# Patient Record
Sex: FEMALE | Race: WHITE | NOT HISPANIC OR LATINO | Employment: OTHER | URBAN - METROPOLITAN AREA
[De-identification: names, ages, dates, MRNs, and addresses within clinical notes are randomized per-mention and may not be internally consistent; named-entity substitution may affect disease eponyms.]

---

## 2017-09-01 ENCOUNTER — TRANSFERRED RECORDS (OUTPATIENT)
Dept: HEALTH INFORMATION MANAGEMENT | Facility: CLINIC | Age: 52
End: 2017-09-01

## 2017-09-01 LAB
ALT SERPL-CCNC: 14 U/L (ref 6–29)
CHOLEST SERPL-MCNC: 173 MG/DL
CREAT SERPL-MCNC: 0.95 MG/DL (ref 0.5–1.05)
GFR SERPL CREATININE-BSD FRML MDRD: 69 ML/MIN/1.73M2
GLUCOSE SERPL-MCNC: 108 MG/DL (ref 65–99)
HBA1C MFR BLD: 5.7 % (ref 4–6)
HDLC SERPL-MCNC: 75 MG/DL
LDLC SERPL CALC-MCNC: 82 MG/DL
NONHDLC SERPL-MCNC: 98 MG/DL
POTASSIUM SERPL-SCNC: 4.9 MMOL/L (ref 3.5–5.3)
TRIGL SERPL-MCNC: 80 MG/DL
TSH SERPL-ACNC: 2.59 UIU/ML (ref 0.3–5)

## 2017-09-19 ENCOUNTER — OFFICE VISIT (OUTPATIENT)
Dept: FAMILY MEDICINE | Facility: CLINIC | Age: 52
End: 2017-09-19
Payer: COMMERCIAL

## 2017-09-19 VITALS
HEART RATE: 96 BPM | SYSTOLIC BLOOD PRESSURE: 118 MMHG | WEIGHT: 124.2 LBS | TEMPERATURE: 99 F | BODY MASS INDEX: 21.32 KG/M2 | DIASTOLIC BLOOD PRESSURE: 78 MMHG | OXYGEN SATURATION: 98 %

## 2017-09-19 DIAGNOSIS — K21.9 GASTROESOPHAGEAL REFLUX DISEASE WITHOUT ESOPHAGITIS: Primary | ICD-10-CM

## 2017-09-19 PROCEDURE — 99214 OFFICE O/P EST MOD 30 MIN: CPT | Performed by: FAMILY MEDICINE

## 2017-09-19 RX ORDER — PANTOPRAZOLE SODIUM 20 MG/1
TABLET, DELAYED RELEASE ORAL
Qty: 30 TABLET | Refills: 0 | Status: SHIPPED | OUTPATIENT
Start: 2017-09-19 | End: 2017-10-18

## 2017-09-19 NOTE — PROGRESS NOTES
SUBJECTIVE:   Faith Oakes is a 52 year old female who presents to clinic today for the following health issues:      GERD/Heartburn  Onset: ongoing for over year    Description:     Burning in chest: YES    Intensity: moderate    Progression of Symptoms: worsening    Accompanying Signs & Symptoms:  Does it feel like food gets stuck: no   Nausea: no   Vomiting (bloody?): no   Abdominal Pain: no   Black-Tarry stools: no :  Bloody stools: no     History:   Previous ulcers: no     Precipitating factors:   Caffeine use: YES - 2 cups a day  Alcohol use: no   NSAID/Aspirin use: no   Tobacco use: no   Worse with spicy foods.    Alleviating factors:  None    Therapies Tried and outcome:none      She felt her symptoms were resolving, so she elected to discontinue her omeprazole. She has recently redeveloped symptoms of burning in the back of the throat. This is following a spicy meal. She is not having any chest pain or epigastric pain currently.    She had improved control on omeprazole last year but reports that she is no longer having that same control taking 40 mg daily.    Had a negative EGD last year around the same time.      Problem list and histories reviewed & adjusted, as indicated.  Additional history: as documented    Patient Active Problem List   Diagnosis     Urinary, incontinence, stress female     Gastroesophageal reflux disease without esophagitis     PND (post-nasal drip)     H/O non-Hodgkin's lymphoma     H/O malignant neoplasm of breast     Past Surgical History:   Procedure Laterality Date     CYSTOSCOPY  9/21/2012    Procedure: CYSTOSCOPY;;  Surgeon: Juma Pineda MD;  Location:  OR     ESOPHAGOSCOPY, GASTROSCOPY, DUODENOSCOPY (EGD), COMBINED N/A 9/22/2016    Procedure: COMBINED ESOPHAGOSCOPY, GASTROSCOPY, DUODENOSCOPY (EGD);  Surgeon: Robert Betts MD;  Location: [facility]     HYSTERECTOMY VAGINAL  9/21/2012    Procedure: HYSTERECTOMY VAGINAL;  VAGINAL HYSTERECTOMY,  TRANSVAGINAL TAPING (Empiribox) cystoscopy;  Surgeon: Juma Pineda MD;  Location: [facility] OR     LUMPECTOMY BREAST WITH SENTINEL NODE, COMBINED  2/3/2012    Procedure:COMBINED LUMPECTOMY BREAST WITH SENTINEL NODE; WIRE LOCALIZED LEFT BREAST LUMPECTOMY WITH LEFT SENTINEL NODE BIOPSY; Surgeon:DANIEL QUIÑONES; Location:[facility]     SLING TRANSVAGINAL  9/21/2012    Procedure: SLING TRANSVAGINAL;;  Surgeon: Juma Pineda MD;  Location: [facility] OR     wisdom teeth[         Social History   Substance Use Topics     Smoking status: Never Smoker     Smokeless tobacco: Never Used     Alcohol use No     Family History   Problem Relation Age of Onset     Hypertension Mother      Coronary Artery Disease Father              Reviewed and updated as needed this visit by clinical staffTobacco  Allergies  Med Hx  Surg Hx  Fam Hx  Soc Hx      Reviewed and updated as needed this visit by Provider         ROS:  Constitutional, HEENT, cardiovascular, pulmonary, gi and gu systems are negative, except as otherwise noted.      OBJECTIVE:   /78 (BP Location: Right arm, Patient Position: Sitting, Cuff Size: Adult Regular)  Pulse 96  Temp 99  F (37.2  C) (Oral)  Wt 124 lb 3.2 oz (56.3 kg)  LMP 08/27/2012  SpO2 98%  Breastfeeding? No  BMI 21.32 kg/m2  Body mass index is 21.32 kg/(m^2).  GENERAL: healthy, alert and no distress  HENT: ear canals and TM's normal, nose and mouth without ulcers or lesions  NECK: no adenopathy, no asymmetry, masses, or scars and thyroid normal to palpation  RESP: lungs clear to auscultation - no rales, rhonchi or wheezes  CV: regular rate and rhythm, normal S1 S2, no S3 or S4, no murmur, click or rub, no peripheral edema and peripheral pulses strong    Diagnostic Test Results:  none     ASSESSMENT/PLAN:     1. Gastroesophageal reflux disease without esophagitis - worsening symptoms, this is still the most likely diagnosis given her presentation of symptoms  ongoing over the past year. Discussed treatment options, considered trial of an H2 blocker versus a stronger PPI. Discussed potential risks and benefits of each choice. She elects to try a stronger PPI. Will trial Protonix 20 mg. Discussed we have the option to increase dosage if necessary. If she has continued symptoms, could consider a gastroenterology consultation to see what a next step may be. Consideration needs to be given to possibility of chest radiation related to her non-Hodgkin's lymphoma versus breast cancer. I failed to ask this question today.  - pantoprazole (PROTONIX) 20 MG EC tablet; Take by mouth 30-60 minutes before a meal.  Dispense: 30 tablet; Refill: 0      Jazzy Camargo MD  [facility]

## 2017-09-19 NOTE — MR AVS SNAPSHOT
After Visit Summary   9/19/2017    Faith Oakes    MRN: [id number]           Patient Information     Date Of Birth          1965        Visit Information        Provider Department      9/19/2017 3:45 PM Jazzy Camargo MD [facility]        Today's Diagnoses     Gastroesophageal reflux disease without esophagitis    -  1       Follow-ups after your visit        Who to contact     If you have questions or need follow up information about today's clinic visit or your schedule please contact [facility] directly at [phone].  Normal or non-critical lab and imaging results will be communicated to you by WiOfferhart, letter or phone within 4 business days after the clinic has received the results. If you do not hear from us within 7 days, please contact the clinic through [employer] Gur[employer] or phone. If you have a critical or abnormal lab result, we will notify you by phone as soon as possible.  Submit refill requests through SureWaves or call your pharmacy and they will forward the refill request to us. Please allow 3 business days for your refill to be completed.          Additional Information About Your Visit        MyChart Information     [employer] gives you secure access to your electronic health record. If you see a primary care provider, you can also send messages to your care team and make appointments. If you have questions, please call your primary care clinic.  If you do not have a primary care provider, please call [phone] and they will assist you.        Care EveryWhere ID     This is your Care EveryWhere ID. This could be used by other organizations to access your [facility] medical records  [id number]        Your Vitals Were     Pulse Temperature Last Period Pulse Oximetry Breastfeeding? BMI (Body Mass Index)    96 99  F (37.2  C) (Oral) 08/27/2012 98% No 21.32 kg/m2       Blood Pressure from Last 3 Encounters:   09/19/17 118/78   09/22/16 102/64    09/09/16 112/73    Weight from Last 3 Encounters:   09/19/17 124 lb 3.2 oz (56.3 kg)   09/09/16 123 lb (55.8 kg)   03/23/16 122 lb (55.3 kg)              Today, you had the following     No orders found for display         Today's Medication Changes          These changes are accurate as of: 9/19/17  4:28 PM.  If you have any questions, ask your nurse or doctor.               Start taking these medicines.        Dose/Directions    pantoprazole 20 MG EC tablet   Commonly known as:  PROTONIX   Used for:  Gastroesophageal reflux disease without esophagitis   Started by:  Jazzy Camargo MD        Take by mouth 30-60 minutes before a meal.   Quantity:  30 tablet   Refills:  0         Stop taking these medicines if you haven't already. Please contact your care team if you have questions.     omeprazole 40 MG capsule   Commonly known as:  priLOSEC   Stopped by:  Jazzy Camargo MD           tamoxifen 20 MG tablet   Commonly known as:  NOLVADEX   Stopped by:  Jazzy Camargo MD                Where to get your medicines      These medications were sent to [facility]/pharmacy #[id number] - [place], [place] - [address]  [address]  [address] , [facility] [address]     Phone:  [phone]     pantoprazole 20 MG EC tablet                Primary Care Provider Office Phone # Fax #    Jazzy Camargo [phone] [address] [facility] [address]        Equal Access to Services     [facility] CenterBELKYS AH: Hadii rubin auo [place], waaxda luqadaha, qaybta kaalmada adeegyada, rusty palomo. So [facility] [phone].    ATENCIÓN: Si habla español, tiene a lorenzo disposición servicios gratuitos de asistencia lingüística. Llmartha al [phone].    We comply with applicable federal civil rights laws and [place] laws. We do not discriminate on the basis of race, color, national origin, age, disability sex, sexual orientation or gender identity.            Thank you!     Thank  you for choosing [facility]  for your care. Our goal is always to provide you with excellent care. Hearing back from our patients is one way we can continue to improve our services. Please take a few minutes to complete the written survey that you may receive in the mail after your visit with us. Thank you!             Your Updated Medication List - Protect others around you: Learn how to safely use, store and throw away your medicines at www.disposemymeds.org.          This list is accurate as of: 9/19/17  4:28 PM.  Always use your most recent med list.                   Brand Name Dispense Instructions for use Diagnosis    ATORVASTATIN CALCIUM PO      Take 20 mg by mouth        calcium gluconate 500 MG Tabs tablet      Take 1,200 mg by mouth daily.        fluticasone 50 MCG/ACT spray    FLONASE    16 g    Spray 1 spray into both nostrils 2 times daily    PND (post-nasal drip)       Multi-vitamin Tabs tablet   Generic drug:  multivitamin, therapeutic with minerals      Take 1 tablet by mouth daily.        omega-3 fatty acids 1200 MG capsule      Take 2 capsules by mouth 2 times daily.        pantoprazole 20 MG EC tablet    PROTONIX    30 tablet    Take by mouth 30-60 minutes before a meal.    Gastroesophageal reflux disease without esophagitis

## 2017-09-19 NOTE — NURSING NOTE
"Chief Complaint   Patient presents with     Heartburn       Initial /78 (BP Location: Right arm, Patient Position: Sitting, Cuff Size: Adult Regular)  Pulse 96  Temp 99  F (37.2  C) (Oral)  Wt 124 lb 3.2 oz (56.3 kg)  LMP 08/27/2012  SpO2 98%  Breastfeeding? No  BMI 21.32 kg/m2 Estimated body mass index is 21.32 kg/(m^2) as calculated from the following:    Height as of 9/9/16: 5' 4\" (1.626 m).    Weight as of this encounter: 124 lb 3.2 oz (56.3 kg).  Medication Reconciliation: complete     Evelio JC      "

## 2017-10-14 ENCOUNTER — MYC MEDICAL ADVICE (OUTPATIENT)
Dept: FAMILY MEDICINE | Facility: CLINIC | Age: 52
End: 2017-10-14

## 2017-10-14 DIAGNOSIS — K21.9 GASTROESOPHAGEAL REFLUX DISEASE WITHOUT ESOPHAGITIS: Primary | ICD-10-CM

## 2017-10-18 RX ORDER — PANTOPRAZOLE SODIUM 20 MG/1
TABLET, DELAYED RELEASE ORAL
Qty: 90 TABLET | Refills: 0 | Status: SHIPPED | OUTPATIENT
Start: 2017-10-18 | End: 2018-01-25

## 2017-12-21 ENCOUNTER — HOSPITAL ENCOUNTER (OUTPATIENT)
Dept: MAMMOGRAPHY | Facility: CLINIC | Age: 52
Discharge: HOME | End: 2017-12-21
Attending: OBSTETRICS & GYNECOLOGY | Admitting: OBSTETRICS & GYNECOLOGY
Payer: COMMERCIAL

## 2017-12-21 DIAGNOSIS — Z12.31 VISIT FOR SCREENING MAMMOGRAM: ICD-10-CM

## 2017-12-21 PROCEDURE — G0202 SCR MAMMO BI INCL CAD: HCPCS

## 2018-01-05 ENCOUNTER — HEALTH MAINTENANCE LETTER (OUTPATIENT)
Age: 53
End: 2018-01-05

## 2018-01-25 ENCOUNTER — MYC MEDICAL ADVICE (OUTPATIENT)
Dept: FAMILY MEDICINE | Facility: CLINIC | Age: 53
End: 2018-01-25

## 2018-01-25 DIAGNOSIS — K21.9 GASTROESOPHAGEAL REFLUX DISEASE WITHOUT ESOPHAGITIS: ICD-10-CM

## 2018-01-26 RX ORDER — PANTOPRAZOLE SODIUM 20 MG/1
TABLET, DELAYED RELEASE ORAL
Qty: 90 TABLET | Refills: 3 | Status: SHIPPED | OUTPATIENT
Start: 2018-01-26 | End: 2019-01-15

## 2018-01-26 RX ORDER — PANTOPRAZOLE SODIUM 20 MG/1
TABLET, DELAYED RELEASE ORAL
Qty: 90 TABLET | Refills: 3 | Status: SHIPPED | OUTPATIENT
Start: 2018-01-26 | End: 2018-01-26

## 2018-01-26 NOTE — TELEPHONE ENCOUNTER
"Requested Prescriptions   Pending Prescriptions Disp Refills     pantoprazole (PROTONIX) 20 MG EC tablet  Last Written Prescription Date:  10/18/2017  Last Fill Quantity: 90 tablet,  # refills: 0   Last Office Visit with [facility] provider:  9/19/2017  90 tablet 0     Sig: Take by mouth 30-60 minutes before a meal.    PPI Protocol Passed    1/25/2018  7:00 PM       Passed - Not on Clopidogrel (unless Pantoprazole ordered)       Passed - No diagnosis of osteoporosis on record       Passed - Recent or future visit with authorizing provider's specialty    Patient had office visit in the last year or has a visit in the next 30 days with authorizing provider.  See \"Patient Info\" tab in inbasket, or \"Choose Columns\" in Meds & Orders section of the refill encounter.            Passed - Patient is age 18 or older       Passed - No active pregnacy on record       Passed - No positive pregnancy test in past 12 months          "

## 2018-01-26 NOTE — TELEPHONE ENCOUNTER
Sam sent from patient.  Please advise on refill      Dr. Camargo,  The burning sensation symptom I have comes and goes.  Sometimes I'll have it for a couple of days and then nothing for a few days or even weeks.  I haven't been able to pin point if a specific food triggers it.  I think stress at work is playing a part.  I was down to the last 7 pills of pantroprazole and started to take it every other day.  I wasn't symptom free when I started the every other day course, which was probably a mistake.  I think at this time it would be better for me to continue taking it.  I've reached out to my pharmacy for a refill request.    If you think I should try something else, please let me know.    Thanks,  Faith

## 2018-12-19 ENCOUNTER — OFFICE VISIT (OUTPATIENT)
Dept: FAMILY MEDICINE | Facility: CLINIC | Age: 53
End: 2018-12-19
Payer: COMMERCIAL

## 2018-12-19 VITALS
RESPIRATION RATE: 18 BRPM | HEART RATE: 113 BPM | HEIGHT: 64 IN | DIASTOLIC BLOOD PRESSURE: 70 MMHG | SYSTOLIC BLOOD PRESSURE: 130 MMHG | TEMPERATURE: 99 F | WEIGHT: 122.5 LBS | BODY MASS INDEX: 20.92 KG/M2 | OXYGEN SATURATION: 98 %

## 2018-12-19 DIAGNOSIS — F43.22 ADJUSTMENT DISORDER WITH ANXIOUS MOOD: ICD-10-CM

## 2018-12-19 DIAGNOSIS — G47.00 INSOMNIA, UNSPECIFIED TYPE: Primary | ICD-10-CM

## 2018-12-19 PROCEDURE — 99214 OFFICE O/P EST MOD 30 MIN: CPT | Performed by: FAMILY MEDICINE

## 2018-12-19 RX ORDER — ESTRADIOL 0.1 MG/G
CREAM VAGINAL
COMMUNITY
Start: 2018-10-17 | End: 2024-06-10

## 2018-12-19 RX ORDER — ZOLPIDEM TARTRATE 5 MG/1
5 TABLET ORAL
Qty: 7 TABLET | Refills: 0 | Status: SHIPPED | OUTPATIENT
Start: 2018-12-19 | End: 2018-12-26

## 2018-12-19 RX ORDER — FLUOCINONIDE 0.5 MG/G
CREAM TOPICAL
Refills: 1 | COMMUNITY
Start: 2018-11-02 | End: 2020-01-28

## 2018-12-19 RX ORDER — KETOCONAZOLE 20 MG/G
CREAM TOPICAL
Refills: 3 | COMMUNITY
Start: 2018-05-11 | End: 2020-01-28

## 2018-12-19 ASSESSMENT — ANXIETY QUESTIONNAIRES
1. FEELING NERVOUS, ANXIOUS, OR ON EDGE: NEARLY EVERY DAY
7. FEELING AFRAID AS IF SOMETHING AWFUL MIGHT HAPPEN: MORE THAN HALF THE DAYS
3. WORRYING TOO MUCH ABOUT DIFFERENT THINGS: NEARLY EVERY DAY
IF YOU CHECKED OFF ANY PROBLEMS ON THIS QUESTIONNAIRE, HOW DIFFICULT HAVE THESE PROBLEMS MADE IT FOR YOU TO DO YOUR WORK, TAKE CARE OF THINGS AT HOME, OR GET ALONG WITH OTHER PEOPLE: SOMEWHAT DIFFICULT
5. BEING SO RESTLESS THAT IT IS HARD TO SIT STILL: SEVERAL DAYS
GAD7 TOTAL SCORE: 14
6. BECOMING EASILY ANNOYED OR IRRITABLE: NOT AT ALL
2. NOT BEING ABLE TO STOP OR CONTROL WORRYING: NEARLY EVERY DAY

## 2018-12-19 ASSESSMENT — PATIENT HEALTH QUESTIONNAIRE - PHQ9
5. POOR APPETITE OR OVEREATING: MORE THAN HALF THE DAYS
SUM OF ALL RESPONSES TO PHQ QUESTIONS 1-9: 8

## 2018-12-19 ASSESSMENT — MIFFLIN-ST. JEOR: SCORE: 1145.66

## 2018-12-19 NOTE — PROGRESS NOTES
"SUBJECTIVE:   Faith Oakes is a 53 year old female presenting with a chief complaint of   Chief Complaint   Patient presents with     Anxiety       She is an established patient of [facility].    HPI: The patient is a 53-year-old female, with history of non-Hodgkin's lymphoma in 1999.  Patient had insomnia and stress related to her lymphoma treatment in 1999, which responded well to Remeron.  Patient presents for evaluation, as she has had similar insomnia symptoms and stress the past 1-2 weeks.  Stress is mostly related to work, as well as a recent change in her job.  Patient is having difficulty falling and staying asleep, but she slept OK last evening.  Patient has tried Melatonin 1 mg, Advil PM, and Benadryl.  Advil PM caused sedation.  The Melatonin and Benadryl were not effective, per patient.  Appetite has been decreased the past week, with 5 pound weight loss noted.  Patient would like to avoid restarting Remeron treatment, as it caused her to gain weight in the past.  Patient had panic attack-like symptoms on 2 occasions this past week, with racing thoughts noted at night.  Patient denies chest pain, shortness of breath, or heart palpitations.  Patient has a good social support system at home.  She states that she \"just needs to get through the next 3 weeks\".    No drug or significant alcohol use.  Patient only drinks 1 cup of coffee per day.  She denies energy drink use.    Review of Systems   Recent blurred vision, diagnosed with a cataract.    Patient Active Problem List   Diagnosis Code     Urinary, incontinence, stress female N39.3     Gastroesophageal reflux disease without esophagitis K21.9     PND (post-nasal drip) R09.82     H/O non-Hodgkin's lymphoma Z85.72     H/O malignant neoplasm of breast Z85.3     Past Medical History:   Diagnosis Date     Anxiety      DCIS (ductal carcinoma in situ) of breast, left      Non Hodgkin's lymphoma (H)      Family History   Problem Relation Age of Onset     " "Hypertension Mother      Coronary Artery Disease Father      Current Outpatient Medications   Medication Sig Dispense Refill     ATORVASTATIN CALCIUM PO Take 20 mg by mouth       calcium gluconate 500 MG TABS Take 1,200 mg by mouth daily.       estradiol (ESTRACE) 0.1 MG/GM vaginal cream        fluocinonide (LIDEX) 0.05 % external cream APPLY TO AFFECTED AREAS ON HANDS TWICE A DAY AS NEEDED  1     fluticasone (FLONASE) 50 MCG/ACT nasal spray Spray 1 spray into both nostrils 2 times daily 16 g 3     ketoconazole (NIZORAL) 2 % external cream APPLY TO FACE TWICE A DAY AS NEEDED  3     Multiple Vitamin (MULTI-VITAMIN) per tablet Take 1 tablet by mouth daily.       Omega-3 Fatty Acids 1200 MG capsule Take 2 capsules by mouth 2 times daily.       pantoprazole (PROTONIX) 20 MG EC tablet Take once daily by mouth 30-60 minutes before a meal. 90 tablet 3            Social History     Tobacco Use     Smoking status: Never Smoker     Smokeless tobacco: Never Used   Substance Use Topics     Alcohol use: No       OBJECTIVE  /70 (BP Location: Right arm, Patient Position: Chair, Cuff Size: Adult Regular)   Pulse 113   Temp 99  F (37.2  C) (Oral)   Resp 18   Ht 1.626 m (5' 4\")   Wt 55.6 kg (122 lb 8 oz)   LMP 08/27/2012   SpO2 98%   Breastfeeding? No   BMI 21.03 kg/m      Physical Exam    GENERAL APPEARANCE:  Awake, alert, and in no acute distress.  PSYCHIATRIC: Appropriate affect.  Smiles at times, but is tearful discussing her previous history.  No suicidal ideation, homicidal ideation, or alton.  PHQ-9=8.  CYNDI-7=14.  HEENT:  Sclera anicteric.  No conjunctivitis.    NECK:  Spontaneous full range of motion.    HEART:  Normal S1, S2.  Regular rate and rhythm.  No murmurs, rubs, or gallops.  LUNGS:  No respiratory distress.  No wheezes, rales, or rhonchi.  EXTREMITIES:  Moves 4 extremities.     NEUROLOGIC:  Gait within normal limits.      Labs:  No results found for this or any previous visit (from the past 24 " hour(s)).    ASSESSMENT:      ICD-10-CM    1. Insomnia, unspecified type, likely due to #2. Patient had similar symptoms insomnia and stress in 1999, which responded well to Remeron.  Patient would like to avoid Remeron currently, as this caused her to gain weight in the past. G47.00 zolpidem (AMBIEN) 5 MG tablet   2. Adjustment disorder with anxious mood, likely due to stress at work and insomnia the past 1-2 weeks. F43.22 TSH with free T4 reflex      PLAN:    Patient declines a Psychology  referral, but would consider this in the future, only as appropriate.    See Sleep Hygiene Measures (discussed at time of visit), as attached to the patient's After Visit Summary.    Discussed risk/benefits of treatment strategies including: sleep hygiene measures, Ambien, Ativan, and Remeron.    Patient is in agreement with the following plan:    Patient Instructions     Trial of Ambien 5 mg nightly as needed for sleep.    Sleep hygiene measures, as attached/discussed.      Follow-up with primary provider in 1 week, sooner as needed.    Follow up in the ER/call 911 if you experience suicidal ideation or other emergent symptoms, as discussed.     Note:  Patient is not listed on the [place] .    Discussed risks and benefits of treatment strategies, as noted in the Assessment and Plan sections.    The patient was discharged ambulatory and in stable condition post discussion of follow up.     Rachel Jesus MD  [facility]

## 2018-12-19 NOTE — PATIENT INSTRUCTIONS
"  Trial of Ambien 5 mg nightly as needed for sleep.    Sleep hygiene measures, as attached/discussed.      Follow-up with primary provider in 1 week, sooner as needed.    Follow up in the ER/call 911 if you experience suicidal ideation or other emergent symptoms, as discussed.    Patient Education   Tips for Sleep Hygiene  \"Sleep hygiene\" means having good sleep habits.Follow these tips to sleep better at night:     Get on a schedule. Go to bed and get up at about the same time every day.    Listen to your body. Only try to sleep when you actually feel tired or sleepy.    Be patient. If you haven't been able to get to sleep after about 30 minutes or more, get up and do something calming or boring until you feel sleepy. Then return to bed and try again.    Don't have caffeine (coffee, tea, cola drinks, chocolate and some medicines), alcohol or nicotine (cigarettes). These can make it harder for you to fall asleep and stay asleep.    Use your bed for sleeping only. That means no TV, computer or homework in bed, especially during the evening and before bedtime.    Don't nap during the day. If you must nap, make sure it is for less than 20 minutes.    Create sleep rituals that remind your body it is time to sleep. Examples include breathing exercises, stretching or reading a book.    Avoid all electronic media (smart phone, computer, tablet) within 2 hours of bed time. The \"blue light\" in these devices activates the part of the brain that keeps you awake.    Dim the lights at night.    Get early morning sources of light (walk in the sunshine) to help set sleep patterns at night.    Try a bath or shower before bed. Having a warm bath 1 to 2 hours before bedtime can help you feel sleepy. Hot baths can make you alert, so be mindful of the temperature.    Don't watch the clock. Checking the clock during the night can wake you up. It can also lead to negative thoughts such as, \"I will never fall asleep,\" which can increase " anxiety and sleeplessness.    Use a sleep diary. Track your sleep schedule to know your sleep patterns and to see where you can improve.    Get regular exercise every day. Try not to do heavy exercise in the 4 hours before bedtime.    Eat a healthy, balanced diet.    Try eating a light, healthy snack before bed, but avoid eating a heavy meal.    Create the right sleeping area. A cool, dark, quiet room is best. If needed, try earplugs, fans and blackout curtains.    Keep your daytime routine the same even if you have a bad night sleep. Avoiding activities the next day can make it harder to sleep.  For informational purposes only. Not to replace the advice of your health care provider.   Copyright   2013 [facility]. All rights reserved. [employer] [id number] - 01/16.

## 2018-12-20 ASSESSMENT — ANXIETY QUESTIONNAIRES: GAD7 TOTAL SCORE: 14

## 2018-12-27 ENCOUNTER — HOSPITAL ENCOUNTER (OUTPATIENT)
Dept: MAMMOGRAPHY | Facility: CLINIC | Age: 53
Discharge: HOME | End: 2018-12-27
Attending: OBSTETRICS & GYNECOLOGY | Admitting: OBSTETRICS & GYNECOLOGY
Payer: COMMERCIAL

## 2018-12-27 DIAGNOSIS — Z12.31 VISIT FOR SCREENING MAMMOGRAM: ICD-10-CM

## 2018-12-27 PROCEDURE — 77067 SCR MAMMO BI INCL CAD: CPT

## 2019-01-02 ENCOUNTER — MYC MEDICAL ADVICE (OUTPATIENT)
Dept: FAMILY MEDICINE | Facility: CLINIC | Age: 54
End: 2019-01-02

## 2019-01-02 DIAGNOSIS — F51.01 PRIMARY INSOMNIA: ICD-10-CM

## 2019-01-03 ENCOUNTER — TELEPHONE (OUTPATIENT)
Dept: FAMILY MEDICINE | Facility: CLINIC | Age: 54
End: 2019-01-03

## 2019-01-03 PROBLEM — F51.01 PRIMARY INSOMNIA: Status: ACTIVE | Noted: 2019-01-03

## 2019-01-03 RX ORDER — ZOLPIDEM TARTRATE 5 MG/1
5 TABLET ORAL
Qty: 30 TABLET | Refills: 5 | Status: SHIPPED | OUTPATIENT
Start: 2019-01-03 | End: 2019-07-02

## 2019-01-14 DIAGNOSIS — K21.9 GASTROESOPHAGEAL REFLUX DISEASE WITHOUT ESOPHAGITIS: ICD-10-CM

## 2019-01-15 RX ORDER — PANTOPRAZOLE SODIUM 20 MG/1
TABLET, DELAYED RELEASE ORAL
Qty: 90 TABLET | Refills: 3 | Status: SHIPPED | OUTPATIENT
Start: 2019-01-15 | End: 2020-01-14

## 2019-01-15 NOTE — TELEPHONE ENCOUNTER
"Requested Prescriptions   Pending Prescriptions Disp Refills     pantoprazole (PROTONIX) 20 MG EC tablet  Last Written Prescription Date:  01/26/2018  Last Fill Quantity: 90,  # refills: 3   Last office visit: 12/19/2018 with prescribing provider:  12/19/2018   Future Office Visit:     90 tablet 3     Sig: Take once daily by mouth 30-60 minutes before a meal.    PPI Protocol Passed - 1/14/2019  8:34 PM       Passed - Not on Clopidogrel (unless Pantoprazole ordered)       Passed - No diagnosis of osteoporosis on record       Passed - Recent (12 mo) or future (30 days) visit within the authorizing provider's specialty    Patient had office visit in the last 12 months or has a visit in the next 30 days with authorizing provider or within the authorizing provider's specialty.  See \"Patient Info\" tab in inbasket, or \"Choose Columns\" in Meds & Orders section of the refill encounter.             Passed - Medication is active on med list       Passed - Patient is age 18 or older       Passed - No active pregnacy on record       Passed - No positive pregnancy test in past 12 months      **PT REQUESTS 90 DAY SUPPLY**    "

## 2019-01-15 NOTE — TELEPHONE ENCOUNTER
Prescription approved per [facility] Refill Protocol.  Alena Arredondo RN, BSN

## 2019-02-14 ENCOUNTER — TRANSFERRED RECORDS (OUTPATIENT)
Dept: HEALTH INFORMATION MANAGEMENT | Facility: CLINIC | Age: 54
End: 2019-02-14

## 2019-09-27 ENCOUNTER — HEALTH MAINTENANCE LETTER (OUTPATIENT)
Age: 54
End: 2019-09-27

## 2019-12-30 ENCOUNTER — TRANSFERRED RECORDS (OUTPATIENT)
Dept: HEALTH INFORMATION MANAGEMENT | Facility: CLINIC | Age: 54
End: 2019-12-30

## 2019-12-30 LAB
ALT SERPL-CCNC: 22 U/L (ref 6–29)
CHOLEST SERPL-MCNC: 176 MG/DL
CREAT SERPL-MCNC: 0.88 MG/DL (ref 0.5–1.05)
GFR SERPL CREATININE-BSD FRML MDRD: 74 ML/MIN/1.73M2
GLUCOSE SERPL-MCNC: 104 MG/DL (ref 65–99)
HBA1C MFR BLD: 5.8 % (ref 4–6)
HDLC SERPL-MCNC: 71 MG/DL
LDLC SERPL CALC-MCNC: 89 MG/DL
NONHDLC SERPL-MCNC: 105 MG/DL
POTASSIUM SERPL-SCNC: 4.3 MMOL/L (ref 3.5–5.3)
TRIGL SERPL-MCNC: 72 MG/DL

## 2020-01-14 DIAGNOSIS — K21.9 GASTROESOPHAGEAL REFLUX DISEASE WITHOUT ESOPHAGITIS: ICD-10-CM

## 2020-01-14 RX ORDER — PANTOPRAZOLE SODIUM 20 MG/1
TABLET, DELAYED RELEASE ORAL
Qty: 30 TABLET | Refills: 0 | Status: SHIPPED | OUTPATIENT
Start: 2020-01-14 | End: 2020-01-28

## 2020-01-14 NOTE — TELEPHONE ENCOUNTER
Prescription approved per [facility] Refill Protocol.  For ana Casillas RN

## 2020-01-28 ENCOUNTER — OFFICE VISIT (OUTPATIENT)
Dept: FAMILY MEDICINE | Facility: CLINIC | Age: 55
End: 2020-01-28
Payer: COMMERCIAL

## 2020-01-28 VITALS
WEIGHT: 126.9 LBS | SYSTOLIC BLOOD PRESSURE: 116 MMHG | HEART RATE: 89 BPM | OXYGEN SATURATION: 100 % | BODY MASS INDEX: 21.78 KG/M2 | TEMPERATURE: 97.9 F | RESPIRATION RATE: 14 BRPM | DIASTOLIC BLOOD PRESSURE: 82 MMHG

## 2020-01-28 DIAGNOSIS — K21.9 GASTROESOPHAGEAL REFLUX DISEASE WITHOUT ESOPHAGITIS: Primary | ICD-10-CM

## 2020-01-28 DIAGNOSIS — R07.89 CHEST PRESSURE: ICD-10-CM

## 2020-01-28 PROCEDURE — 99214 OFFICE O/P EST MOD 30 MIN: CPT | Performed by: FAMILY MEDICINE

## 2020-01-28 RX ORDER — CYCLOSPORINE 0.5 MG/ML
1 EMULSION OPHTHALMIC 2 TIMES DAILY
COMMUNITY

## 2020-01-28 RX ORDER — PANTOPRAZOLE SODIUM 40 MG/1
40 TABLET, DELAYED RELEASE ORAL DAILY
Qty: 30 TABLET | Refills: 0 | Status: SHIPPED | OUTPATIENT
Start: 2020-01-28 | End: 2021-04-29

## 2020-01-28 RX ORDER — PANTOPRAZOLE SODIUM 20 MG/1
TABLET, DELAYED RELEASE ORAL
Qty: 90 TABLET | Refills: 0 | Status: SHIPPED | OUTPATIENT
Start: 2020-01-28 | End: 2020-05-05

## 2020-01-28 RX ORDER — DOXYCYCLINE 50 MG/1
50 CAPSULE ORAL
COMMUNITY

## 2020-01-28 ASSESSMENT — ANXIETY QUESTIONNAIRES
5. BEING SO RESTLESS THAT IT IS HARD TO SIT STILL: NOT AT ALL
7. FEELING AFRAID AS IF SOMETHING AWFUL MIGHT HAPPEN: NOT AT ALL
GAD7 TOTAL SCORE: 1
3. WORRYING TOO MUCH ABOUT DIFFERENT THINGS: NOT AT ALL
IF YOU CHECKED OFF ANY PROBLEMS ON THIS QUESTIONNAIRE, HOW DIFFICULT HAVE THESE PROBLEMS MADE IT FOR YOU TO DO YOUR WORK, TAKE CARE OF THINGS AT HOME, OR GET ALONG WITH OTHER PEOPLE: NOT DIFFICULT AT ALL
2. NOT BEING ABLE TO STOP OR CONTROL WORRYING: NOT AT ALL
6. BECOMING EASILY ANNOYED OR IRRITABLE: NOT AT ALL
1. FEELING NERVOUS, ANXIOUS, OR ON EDGE: SEVERAL DAYS

## 2020-01-28 ASSESSMENT — PATIENT HEALTH QUESTIONNAIRE - PHQ9
SUM OF ALL RESPONSES TO PHQ QUESTIONS 1-9: 0
5. POOR APPETITE OR OVEREATING: NOT AT ALL

## 2020-01-28 NOTE — PROGRESS NOTES
Subjective     Faith Oakes is a 54 year old female who presents to clinic today for the following health issues:    HPI   Medication Followup of pantoprazole    Taking Medication as prescribed: yes    Side Effects:  None    Medication Helping Symptoms:  yes       Taking 20 mg daily. Missed 1 day 2 weeks ago. Since then, has had more cough and indigestion. Resumed taking her 20 mg daily following this.     Was considering weaning the med as her symptoms seem to be well controlled prior to this episode.     Notes stress seems to be a trigger for her.   Notes central chest pressure with stressful events.   Can exercise vigorously without chest pressure.         Patient Active Problem List   Diagnosis     Urinary, incontinence, stress female     Gastroesophageal reflux disease without esophagitis     PND (post-nasal drip)     H/O non-Hodgkin's lymphoma     H/O malignant neoplasm of breast     Primary insomnia     Past Surgical History:   Procedure Laterality Date     CYSTOSCOPY  9/21/2012    Procedure: CYSTOSCOPY;;  Surgeon: Juma Pineda MD;  Location:  OR     ESOPHAGOSCOPY, GASTROSCOPY, DUODENOSCOPY (EGD), COMBINED N/A 9/22/2016    Procedure: COMBINED ESOPHAGOSCOPY, GASTROSCOPY, DUODENOSCOPY (EGD);  Surgeon: Robert Betts MD;  Location: [facility]     HYSTERECTOMY VAGINAL  9/21/2012    Procedure: HYSTERECTOMY VAGINAL;  VAGINAL HYSTERECTOMY, TRANSVAGINAL TAPING ([facility]ap[facility]) cystoscopy;  Surgeon: Juma Pineda MD;  Location:  OR     HYSTERECTOMY, PAP NO LONGER INDICATED       LUMPECTOMY BREAST WITH SENTINEL NODE, COMBINED  2/3/2012    Procedure:COMBINED LUMPECTOMY BREAST WITH SENTINEL NODE; WIRE LOCALIZED LEFT BREAST LUMPECTOMY WITH LEFT SENTINEL NODE BIOPSY; Surgeon:DANIEL QUIÑONES; Location: [facility]     SLING TRANSVAGINAL  9/21/2012    Procedure: SLING TRANSVAGINAL;;  Surgeon: Juma Pineda MD;  Location:  OR     wisdom teeth[          Social History     Tobacco Use     Smoking status: Never Smoker     Smokeless tobacco: Never Used   Substance Use Topics     Alcohol use: No     Family History   Problem Relation Age of Onset     Hypertension Mother      Coronary Artery Disease Father          Reviewed and updated as needed this visit by Provider  Tobacco  Allergies  Meds  Problems  Med Hx  Surg Hx  Fam Hx  Soc Hx          Review of Systems   ROS COMP: Constitutional, HEENT, cardiovascular, pulmonary, gi and gu systems are negative, except as otherwise noted.      Objective    /82 (BP Location: Right arm, Patient Position: Chair, Cuff Size: Adult Regular)   Pulse 89   Temp 97.9  F (36.6  C) (Oral)   Resp 14   Wt 57.6 kg (126 lb 14.4 oz)   LMP 08/27/2012   SpO2 100%   BMI 21.78 kg/m    Body mass index is 21.78 kg/m .  Physical Exam   GENERAL: healthy, alert and no distress  NECK: no adenopathy, no asymmetry, masses, or scars and thyroid normal to palpation  RESP: lungs clear to auscultation - no rales, rhonchi or wheezes  CV: regular rate and rhythm, normal S1 S2, no S3 or S4, no murmur, click or rub, no peripheral edema and peripheral pulses strong  ABDOMEN: soft, nontender, no hepatosplenomegaly, no masses and bowel sounds normal  PSYCH: mentation appears normal, affect normal/bright    Diagnostic Test Results:  Labs reviewed in Epic        Assessment & Plan     1. Gastroesophageal reflux disease without esophagitis - recent flare due to missed dose. Suggested 40 mg pantoprazole for 1 month, then step down to 20 mg for 2 months. Following this, if she would like to wean, she can reach out to me to discuss weaning process - advised e-visit or phone visit.   - pantoprazole (PROTONIX) 20 MG EC tablet; Take once daily by mouth 30-60 minutes before a meal.  Dispense: 90 tablet; Refill: 0  - pantoprazole (PROTONIX) 40 MG EC tablet; Take 1 tablet (40 mg) by mouth daily  Dispense: 30 tablet; Refill: 0    2. Chest pressure - normal  cardiac exam today, this is unlikely anginal given no exertional symptoms. Discussed this is most likely related to anxiety.       Return in about 3 months (around 4/28/2020) for Medication Recheck.    Jazzy Camargo MD  [facility]

## 2020-01-29 ASSESSMENT — ANXIETY QUESTIONNAIRES: GAD7 TOTAL SCORE: 1

## 2020-02-21 DIAGNOSIS — K21.9 GASTROESOPHAGEAL REFLUX DISEASE WITHOUT ESOPHAGITIS: ICD-10-CM

## 2020-02-21 RX ORDER — PANTOPRAZOLE SODIUM 40 MG/1
40 TABLET, DELAYED RELEASE ORAL DAILY
Qty: 30 TABLET | Refills: 0 | Status: CANCELLED | OUTPATIENT
Start: 2020-02-21

## 2020-02-21 RX ORDER — PANTOPRAZOLE SODIUM 40 MG/1
40 TABLET, DELAYED RELEASE ORAL DAILY
Qty: 30 TABLET | Refills: 0 | OUTPATIENT
Start: 2020-02-21

## 2020-02-21 NOTE — TELEPHONE ENCOUNTER
Spoke with pt and she did not request this refill and she asked them to not put it on auto-refill.  Rx denied    Per LOV:  1. Gastroesophageal reflux disease without esophagitis - recent flare due to missed dose. Suggested 40 mg pantoprazole for 1 month, then step down to 20 mg for 2 months. Following this, if she would like to wean, she can reach out to me to discuss weaning process - advised e-visit or phone visit.

## 2020-03-10 DIAGNOSIS — K21.9 GASTROESOPHAGEAL REFLUX DISEASE WITHOUT ESOPHAGITIS: ICD-10-CM

## 2020-03-10 RX ORDER — PANTOPRAZOLE SODIUM 40 MG/1
40 TABLET, DELAYED RELEASE ORAL DAILY
Qty: 30 TABLET | Refills: 0 | OUTPATIENT
Start: 2020-03-10

## 2020-03-10 NOTE — TELEPHONE ENCOUNTER
See February refill.  Pt was only taking this dose for a month and told the pharmacy to take it off auto fill  Alena Arredondo RN, BSN

## 2020-03-15 ENCOUNTER — HEALTH MAINTENANCE LETTER (OUTPATIENT)
Age: 55
End: 2020-03-15

## 2020-05-05 DIAGNOSIS — K21.9 GASTROESOPHAGEAL REFLUX DISEASE WITHOUT ESOPHAGITIS: ICD-10-CM

## 2020-05-05 RX ORDER — PANTOPRAZOLE SODIUM 20 MG/1
TABLET, DELAYED RELEASE ORAL
Qty: 90 TABLET | Refills: 2 | Status: SHIPPED | OUTPATIENT
Start: 2020-05-05 | End: 2021-01-12

## 2020-05-05 NOTE — TELEPHONE ENCOUNTER
Prescription approved per [facility] Refill Protocol.  Alena Arredondo RN, BSN

## 2020-11-20 ENCOUNTER — HOSPITAL ENCOUNTER (OUTPATIENT)
Dept: MAMMOGRAPHY | Facility: CLINIC | Age: 55
Discharge: HOME | End: 2020-11-20
Attending: OBSTETRICS & GYNECOLOGY | Admitting: OBSTETRICS & GYNECOLOGY
Payer: COMMERCIAL

## 2020-11-20 DIAGNOSIS — Z12.31 VISIT FOR SCREENING MAMMOGRAM: ICD-10-CM

## 2020-11-20 PROCEDURE — 77067 SCR MAMMO BI INCL CAD: CPT

## 2021-01-09 ENCOUNTER — HEALTH MAINTENANCE LETTER (OUTPATIENT)
Age: 56
End: 2021-01-09

## 2021-01-12 DIAGNOSIS — K21.9 GASTROESOPHAGEAL REFLUX DISEASE WITHOUT ESOPHAGITIS: ICD-10-CM

## 2021-01-12 RX ORDER — PANTOPRAZOLE SODIUM 20 MG/1
TABLET, DELAYED RELEASE ORAL
Qty: 90 TABLET | Refills: 0 | Status: SHIPPED | OUTPATIENT
Start: 2021-01-12 | End: 2021-04-09

## 2021-01-12 NOTE — TELEPHONE ENCOUNTER
Medication is being filled for 1 time refill only due to:  Patient needs to be seen because needs appt.    mychart sent  Alena Arredondo RN, BSN

## 2021-02-10 ENCOUNTER — IMMUNIZATION (OUTPATIENT)
Dept: NURSING | Facility: CLINIC | Age: 56
End: 2021-02-10
Payer: COMMERCIAL

## 2021-02-10 PROCEDURE — 91300 PR COVID VAC PFIZER DIL RECON 30 MCG/0.3 ML IM: CPT

## 2021-02-10 PROCEDURE — 0001A PR COVID VAC PFIZER DIL RECON 30 MCG/0.3 ML IM: CPT

## 2021-03-03 ENCOUNTER — IMMUNIZATION (OUTPATIENT)
Dept: NURSING | Facility: CLINIC | Age: 56
End: 2021-03-03
Attending: INTERNAL MEDICINE
Payer: COMMERCIAL

## 2021-03-03 PROCEDURE — 0002A PR COVID VAC PFIZER DIL RECON 30 MCG/0.3 ML IM: CPT

## 2021-03-03 PROCEDURE — 91300 PR COVID VAC PFIZER DIL RECON 30 MCG/0.3 ML IM: CPT

## 2021-04-09 DIAGNOSIS — K21.9 GASTROESOPHAGEAL REFLUX DISEASE WITHOUT ESOPHAGITIS: ICD-10-CM

## 2021-04-09 RX ORDER — PANTOPRAZOLE SODIUM 20 MG/1
TABLET, DELAYED RELEASE ORAL
Qty: 30 TABLET | Refills: 0 | Status: SHIPPED | OUTPATIENT
Start: 2021-04-09 | End: 2021-04-29

## 2021-04-09 NOTE — TELEPHONE ENCOUNTER
Routing refill request to provider for review/approval because:  Christi given x1 and patient did not follow up, please advise    Gutierrez RAYMOND RN

## 2021-04-09 NOTE — TELEPHONE ENCOUNTER
Attempted to reach patient, LVMTCB. Patient was sent a limited prescription for pantoprazole (protonix) - patient needs to schedule OV w/ PCP for future refills. [employer] message sent to patient.     Gutierrez RAYMOND RN

## 2021-04-12 NOTE — TELEPHONE ENCOUNTER
[employer] message marked as read by patient at 4:26 PM on 4/9/2021. Per chart review, patient is now scheduled for OV w/ [id number]. 4/29/21.     Gutierrez RAYMOND RN

## 2021-04-29 ENCOUNTER — VIRTUAL VISIT (OUTPATIENT)
Dept: FAMILY MEDICINE | Facility: CLINIC | Age: 56
End: 2021-04-29
Payer: COMMERCIAL

## 2021-04-29 DIAGNOSIS — F41.1 GAD (GENERALIZED ANXIETY DISORDER): ICD-10-CM

## 2021-04-29 DIAGNOSIS — K21.9 GASTROESOPHAGEAL REFLUX DISEASE WITHOUT ESOPHAGITIS: Primary | ICD-10-CM

## 2021-04-29 PROCEDURE — 99213 OFFICE O/P EST LOW 20 MIN: CPT | Mod: 95 | Performed by: FAMILY MEDICINE

## 2021-04-29 RX ORDER — PANTOPRAZOLE SODIUM 20 MG/1
TABLET, DELAYED RELEASE ORAL
Qty: 90 TABLET | Refills: 3 | Status: SHIPPED | OUTPATIENT
Start: 2021-04-29 | End: 2023-09-12

## 2021-04-29 NOTE — PROGRESS NOTES
Faith is a 55 year old who is being evaluated via a billable video visit.      How would you like to obtain your AVS? MyChart  If the video visit is dropped, the invitation should be resent by: Text to cell phone: [phone]  Will anyone else be joining your video visit? No      Video Start Time: 8:47 AM    Assessment & Plan     Gastroesophageal reflux disease without esophagitis - discussed that it is possible to having increase in stomach acid during stressful times. Discussed options for treating anxiety below. Will continue same dose of pantoprazole for now, can start weaning as her symptoms improve.   - pantoprazole (PROTONIX) 20 MG EC tablet; Take once daily by mouth 30-60 minutes before a meal.    CYNDI (generalized anxiety disorder) - discussed treatment options. Because her triggers are deadlines, we discussed may not be appropriate to use anxiolytics as this could cause sedation. She agrees. With start with CBT.   - MENTAL HEALTH REFERRAL  - Adult; Outpatient Treatment; Individual/Couples/Family/Group Therapy/Health Psychology; [facility]: [facility] [phone]; We will contact you to schedule the appointment or please call with any questions    Return in about 1 year (around 4/29/2022) for Medication Recheck.    Jazzy Camargo MD  [facility]   Faith is a 55 year old who presents for the following health issues     HPI     Medication Followup of pantoprazole    Taking Medication as prescribed: yes    Side Effects:  None    Medication Helping Symptoms:  yes       Finds that her GERD is related to increase in stress, particularly deadlines and appt times.     Taking 20 mg pantoprazole to help with her GERD symptoms, finds this works well enough for her. Will have some mild symptoms during high stress times.     Her GERD symptoms are not associated with eating.       Review of Systems   Constitutional, HEENT, cardiovascular, pulmonary, gi and gu  systems are negative, except as otherwise noted.      Objective           Vitals:  No vitals were obtained today due to virtual visit.    Physical Exam   GENERAL: Healthy, alert and no distress  EYES: Eyes grossly normal to inspection.  No discharge or erythema, or obvious scleral/conjunctival abnormalities.  RESP: No audible wheeze, cough, or visible cyanosis.  No visible retractions or increased work of breathing.    SKIN: Visible skin clear. No significant rash, abnormal pigmentation or lesions.  NEURO: Cranial nerves grossly intact.  Mentation and speech appropriate for age.  PSYCH: Mentation appears normal, affect normal/bright, judgement and insight intact, normal speech and appearance well-groomed.          Video-Visit Details    Type of service:  Video Visit    Video End Time:8:56 AM    Originating Location (pt. Location): Home    Distant Location (provider location):  [facility]     Platform used for Video Visit: [employer]

## 2021-06-21 ASSESSMENT — ANXIETY QUESTIONNAIRES
4. TROUBLE RELAXING: SEVERAL DAYS
5. BEING SO RESTLESS THAT IT IS HARD TO SIT STILL: NOT AT ALL
GAD7 TOTAL SCORE: 5
1. FEELING NERVOUS, ANXIOUS, OR ON EDGE: SEVERAL DAYS
6. BECOMING EASILY ANNOYED OR IRRITABLE: NOT AT ALL
GAD7 TOTAL SCORE: 5
GAD7 TOTAL SCORE: 5
7. FEELING AFRAID AS IF SOMETHING AWFUL MIGHT HAPPEN: NOT AT ALL
2. NOT BEING ABLE TO STOP OR CONTROL WORRYING: SEVERAL DAYS
7. FEELING AFRAID AS IF SOMETHING AWFUL MIGHT HAPPEN: NOT AT ALL
3. WORRYING TOO MUCH ABOUT DIFFERENT THINGS: MORE THAN HALF THE DAYS

## 2021-06-21 ASSESSMENT — PATIENT HEALTH QUESTIONNAIRE - PHQ9
SUM OF ALL RESPONSES TO PHQ QUESTIONS 1-9: 1
SUM OF ALL RESPONSES TO PHQ QUESTIONS 1-9: 1

## 2021-06-22 ENCOUNTER — VIRTUAL VISIT (OUTPATIENT)
Dept: PSYCHOLOGY | Facility: CLINIC | Age: 56
End: 2021-06-22
Attending: FAMILY MEDICINE
Payer: COMMERCIAL

## 2021-06-22 DIAGNOSIS — F41.1 GENERALIZED ANXIETY DISORDER: Primary | ICD-10-CM

## 2021-06-22 PROCEDURE — 90791 PSYCH DIAGNOSTIC EVALUATION: CPT | Mod: 95 | Performed by: COUNSELOR

## 2021-06-22 ASSESSMENT — COLUMBIA-SUICIDE SEVERITY RATING SCALE - C-SSRS
3. HAVE YOU BEEN THINKING ABOUT HOW YOU MIGHT KILL YOURSELF?: YES
ATTEMPT PAST THREE MONTHS: NO
ATTEMPT LIFETIME: NO
REASONS FOR IDEATION LIFETIME: MOSTLY TO END OR STOP THE PAIN (YOU COULDN'T GO ON LIVING WITH THE PAIN OR HOW YOU WERE FEELING)
TOTAL  NUMBER OF INTERRUPTED ATTEMPTS PAST 3 MONTHS: NO
6. HAVE YOU EVER DONE ANYTHING, STARTED TO DO ANYTHING, OR PREPARED TO DO ANYTHING TO END YOUR LIFE?: NO
TOTAL  NUMBER OF INTERRUPTED ATTEMPTS LIFETIME: NO
5. HAVE YOU STARTED TO WORK OUT OR WORKED OUT THE DETAILS OF HOW TO KILL YOURSELF? DO YOU INTEND TO CARRY OUT THIS PLAN?: NO
1. IN THE PAST MONTH, HAVE YOU WISHED YOU WERE DEAD OR WISHED YOU COULD GO TO SLEEP AND NOT WAKE UP?: YES
5. HAVE YOU STARTED TO WORK OUT OR WORKED OUT THE DETAILS OF HOW TO KILL YOURSELF? DO YOU INTEND TO CARRY OUT THIS PLAN?: NO
1. IN THE PAST MONTH, HAVE YOU WISHED YOU WERE DEAD OR WISHED YOU COULD GO TO SLEEP AND NOT WAKE UP?: NO
4. HAVE YOU HAD THESE THOUGHTS AND HAD SOME INTENTION OF ACTING ON THEM?: NO
2. HAVE YOU ACTUALLY HAD ANY THOUGHTS OF KILLING YOURSELF?: NO
4. HAVE YOU HAD THESE THOUGHTS AND HAD SOME INTENTION OF ACTING ON THEM?: NO
TOTAL  NUMBER OF ABORTED OR SELF INTERRUPTED ATTEMPTS PAST 3 MONTHS: NO
2. HAVE YOU ACTUALLY HAD ANY THOUGHTS OF KILLING YOURSELF LIFETIME?: YES
TOTAL  NUMBER OF ABORTED OR SELF INTERRUPTED ATTEMPTS PAST LIFETIME: NO
6. HAVE YOU EVER DONE ANYTHING, STARTED TO DO ANYTHING, OR PREPARED TO DO ANYTHING TO END YOUR LIFE?: NO

## 2021-06-22 ASSESSMENT — PATIENT HEALTH QUESTIONNAIRE - PHQ9: SUM OF ALL RESPONSES TO PHQ QUESTIONS 1-9: 1

## 2021-06-22 ASSESSMENT — ANXIETY QUESTIONNAIRES: GAD7 TOTAL SCORE: 5

## 2021-06-22 NOTE — PROGRESS NOTES
"[facility]  Provider Name:  Sarah Morocho    Credentials:  [facility]    PATIENT'S NAME: Faith Oakes  PREFERRED NAME: Faith  PRONOUNS:  she/her     MRN: [id number]  : 1965  ADDRESS: [address] [facility] [address]  ACCT. NUMBER:  [id number]  DATE OF SERVICE: 21  START TIME: 9am  END TIME: 10am  PREFERRED PHONE: [phone]  May we leave a program related message: Yes  SERVICE MODALITY:  Video Visit:      Provider verified identity through the following two step process.  Patient provided:  Patient     Telemedicine Visit: The patient's condition can be safely assessed and treated via synchronous audio and visual telemedicine encounter.      Reason for Telemedicine Visit: Services only offered telehealth    Originating Site (Patient Location): Patient's home    Distant Site (Provider Location): Provider Remote Setting- Home Office    Consent:  The patient/guardian has verbally consented to: the potential risks and benefits of telemedicine (video visit) versus in person care; bill my insurance or make self-payment for services provided; and responsibility for payment of non-covered services.     Patient would like the video invitation sent by:  My Chart    Mode of Communication:  Video Conference via Amwell    As the provider I attest to compliance with applicable laws and regulations related to telemedicine.    UNIVERSAL ADULT Mental Health DIAGNOSTIC ASSESSMENT    Identifying Information:  Patient is a 56 year old, CaucasianCaucasian.  The pronoun use throughout this assessment reflects the patient's chosen pronoun.  Patient was referred for an assessment by Dr. Zepeda at [facility] care provider.  Patient attended the session alone. Client resides in [place], [place].     Chief Complaint:   The reason for seeking services at this time is: \" I went on an acid reflux medication about 5 years ago or so due to a dry cough. I also had a burning " "sensation but did not seem to be related to food. Went off and on the medication Over the last 4 months I was able to put together that I felt the symptoms when I have a deadline that I need to meet that is work related or something else. I have expectations about when t get things done then I fel anxious. I am able to get things done but I feel the pressure in my chest and feel the burning sensation. It seems to subside when the deadline is over. I would like to know how to handle it better so I can go off of the medication.\"   The problem(s) began started five years ago. Patient has attempted to resolve these concerns in the past through tapering on and off the medication, paying attention to circumstances around the symptoms .    Social/Family History:  Patient reported they grew up in [place], [place].  They were raised by biological parents and biological mother.  Parents stayed .   Patient reported that their childhood was \"happy, felt secure and loved\". Client reported she has 3 siblings and she is the second born to her parent. Patient described their current relationships with family of origin as \"good relationship with my parents and see them once every 4-6 weeks.  I have a good relationship with all of my siblings but have the best relationship with my sister. I see her weekly as we share a cabin.\"    The patient describes their cultural background as \"middle class upbringing, times when money was tight as dad was without a job a couple of times when I was younger and I was aware of that. We are Gnosticism and that was very strong growing up\".  Cultural influences and impact on patient's life structure, values, norms, and healthcare: Racial or Ethnic Self-Identification , Time Orientation: \" I struggle with it. I want to get to my appt earlier than I do but I tend not to because I do not like sitting around waiting. I like to keep busy so not a lot of down time but I would like to change " "that\", Social Orientation: \" with covid that changed quite a bit as it was work but now I work from home so its family\", Verbal / Non-verbal Communication Style: \"quiet and passive\", Locus of Control: Yes, Spiritual Beliefs: Yarsani (used to attend mass weekly) praying daily \" I cannot imagine my life without jes\", Health Beliefs and the endorsement of OR engagement in Culturally Specific Healing Practices: \"happy medium, I will go when I know I need to and do preventative visits\" and Cultural Bias none reported.  Contextual influences on patient's health include: Individual Factors deadlines  and Community Factors covid changed dynamics for all .    These factors will be addressed in the Preliminary Treatment plan.  Patient identified their preferred language to be English. Patient reported they does not need the assistance of an  or other support involved in therapy.     Patient reported had no significant delays in developmental tasks.   Patient's highest education level was some college. Patient identified the following learning problems: none reported.  Modifications will not be used to assist communication in therapy.   Patient reports they are  able to understand written materials.    Patient reported the following relationship history client has been  for 35 years.  Patient's current relationship status is  for 35 years.   Patient identified their sexual orientation as heterosexual.  Patient reported having three child(kaitlynn). Patient identified mother, siblings, friends and spouse as part of their support system.  Patient identified the quality of these relationships as good.      Patient's current living/housing situation involves staying in own home/apartment.  They live with  and dog and they report that housing is stable.     Patient is currently employed full time and reports they are able to function appropriately at work..  Patient reports their finances are obtained " through employment and partner.  Patient does not identify finances as a current stressor.      Patient reported that they have not been involved with the legal system.   Patient denies being on probation / parole / under the jurisdiction of the court.    Patient's Strengths and Limitations:  Patient identified the following strengths or resources that will help them succeed in treatment: friends / good social support, family support, intelligence, positive work environment, strong social skills and work ethic. Things that may interfere with the patient's success in treatment include: none identified.     Personal and Family Medical History:   Patient does not report a family history of mental health concerns.  Patient reports family history includes Coronary Artery Disease in her father; Hypertension in her mother..     Patient does report Mental Health Diagnosis and/or Treatment.  Patient Patient reported the following previous diagnoses which include(s): none reported.  Patient reported symptoms began 1999/2000 during cancer treatment.   Patient has received mental health services in the past: therapy with [facility] .  Psychiatric Hospitalizations: None.  Patient denies a history of civil commitment.  Currently, patient is not receiving other mental health services.  These include none.           Patient has not had a physical exam to rule out medical causes for current symptoms.  Date of last physical exam was greater than a year ago and client was encouraged to schedule an exam with PCP. The patient has a [place] [facility] Primary Care Provider, who is named Jazzy Camargo..  Patient reports the following current medical concerns: cough and burning in chest .  Patient denies any issues with pain..   There are not significant appetite / nutritional concerns / weight changes.   Patient does not report a history of head injury / trauma / cognitive impairment.      Patient reports current meds as:   No outpatient  medications have been marked as taking for the 6/22/21 encounter (Virtual Visit) with Sraah Morocho [facility].       Medication Adherence:  Patient reports taking prescribed medications as prescribed.    Patient Allergies:    Allergies   Allergen Reactions     Amoxicillin Rash       Medical History:    Past Medical History:   Diagnosis Date     Anxiety      DCIS (ductal carcinoma in situ) of breast, left      Non Hodgkin's lymphoma (H)          Current Mental Status Exam:   Appearance:  Appropriate    Eye Contact:  Good   Psychomotor:  Normal       Gait / station:  Video- seated  Attitude / Demeanor: Cooperative  Friendly Pleasant  Speech      Rate / Production: Normal/ Responsive      Volume:  Normal  volume      Language:  intact  Mood:   Normal  Affect:   Appropriate    Thought Content: Clear   Thought Process: Logical       Associations: No loosening of associations  Insight:   Good  and Fair   Judgment:  Intact   Orientation:  All  Attention/concentration: Good    Rating Scales:    PHQ9:    PHQ-9 SCORE 12/19/2018 1/28/2020 6/21/2021   PHQ-9 Total Score MyChart - - 1 (Minimal depression)   PHQ-9 Total Score 8 0 1   ;    GAD7:    CYNDI-7 SCORE 12/19/2018 1/28/2020 6/21/2021   Total Score - - 5 (mild anxiety)   Total Score 14 1 5     CGI:     First:No data recorded;    Most recentNo data recorded    Substance Use:  Patient did report a family history of substance use concerns; see medical history section for details.  Patient has not received chemical dependency treatment in the past.  Patient has not ever been to detox.      Patient is not currently receiving any chemical dependency treatment. Patient reported the following problems as a result of their substance use:  none reported.    Patient reports using alcohol 1 times per month and has 1 mixed drinks at a time. Patient first started drinking at age 18.  Patient reported date of last use was 1.5 weeks ago .  Patient reports heaviest use is current  use.  Patient denies using tobacco.  Patient denies using cannabis.  Patient reports using caffeine 1 times per day and drinks 1 at a time. Patient started using caffeine at age 16.  Patient reports using/abusing the following substance(s). Patient reported no other substance use.     CAGE- AID:    CAGE-AID Total Score 2021   Total Score 0   Total Score MyChart 0 (A total score of 2 or greater is considered clinically significant)       Substance Use: No symptoms    Based on the negative CAGE score and clinical interview there  are not indications of drug or alcohol abuse.    Significant Losses / Trauma / Abuse / Neglect Issues:   Patient did not  serve in the .  There are indications or report of significant loss, trauma, abuse or neglect issues related to: death of MIL  in  of cancer and major medical problems Lymphoma in  and then breast cancer in .  Concerns for possible neglect are not present.     Safety Assessment:   Current Safety Concerns:  Meally Suicide Severity Rating Scale (Lifetime/Recent)  Meally Suicide Severity Rating (Lifetime/Recent) 2021   1. Wish to be Dead (Lifetime) Yes   1. Wish to be Dead (Recent) No   2. Non-Specific Active Suicidal Thoughts (Lifetime) Yes   2. Non-Specific Active Suicidal Thoughts (Recent) No   3. Active Suicidal Ideation with any Methods (Not Plan) Without Intent to Act (Lifetime) Yes   3. Active Suicidal Ideation with any Methods (Not Plan) Without Intent to Act (Recent) No   4. Active Suicidal Ideation with Some Intent to Act, Without Specific Plan (Lifetime) No   4. Active Suicidal Ideation with Some Intent to Act, Without Specific Plan (Recent) No   5. Active Suicidal Ideation with Specific Plan and Intent (Lifetime) No   5. Active Suicidal Ideation with Specific Plan and Intent (Recent) No   Most Severe Ideation Rating (Lifetime) 4   Most Severe Ideation Description (Lifetime) Diagnosed with cancer and felt physically sick and  did not want to feel that anymore (1999)   Frequency (Lifetime) 5   Duration (Lifetime) 1   Controllability (Lifetime) 3   Protective Factors  (Lifetime) 1   Reasons for Ideation (Lifetime) 4   Most Severe Ideation Rating (Past Month) NA   Frequency (Past Month) NA   Duration (Past Month) NA   Controllability (Past Month) NA   Protective Factors (Past Month) NA   Reasons for Ideation (Past Month) NA   Actual Attempt (Lifetime) No   Actual Attempt (Past 3 Months) No   Has subject engaged in non-suicidal self-injurious behavior? (Lifetime) No   Has subject engaged in non-suicidal self-injurious behavior? (Past 3 Months) No   Interrupted Attempts (Lifetime) No   Interrupted Attempts (Past 3 Months) No   Aborted or Self-Interrupted Attempt (Lifetime) No   Aborted or Self-Interrupted Attempt (Past 3 Months) No   Preparatory Acts or Behavior (Lifetime) No   Preparatory Acts or Behavior (Past 3 Months) No   Most Recent Attempt Actual Lethality Code NA   Most Lethal Attempt Actual Lethality Code NA   Initial/First Attempt Actual Lethality Code NA     Patient denies current homicidal ideation and behaviors.  Patient denies current self-injurious ideation and behaviors.    Patient denied risk behaviors associated with substance use.  Patient denies any high risk behaviors associated with mental health symptoms.  Patient reports the following current concerns for their personal safety: None.  Patient reports there are not firearms in the house.        .    History of Safety Concerns:  Patient denied a history of homicidal ideation.     Patient denied a history of personal safety concerns.    Patient denied a history of assaultive behaviors.    Patient denied a history of sexual assault behaviors.     Patient denied a history of risk behaviors associated with substance use.  Patient denies any history of high risk behaviors associated with mental health symptoms.  Patient reports the following protective factors: forward or  future oriented thinking;dedication to family or friends;safe and stable environment;regular sleep;regular physical activity;sense of belonging;purpose;healthy fear of risky behaviors or pain    Risk Plan:  See Recommendations for Safety and Risk Management Plan    Review of Symptoms per patient report:  Depression: No symptoms and Ruminations  Fabiola:  No Symptoms  Psychosis: No Symptoms  Anxiety: Physical complaints, such as headaches, stomachaches, muscle tension, Social anxiety, Fears/phobias heights, Sleep disturbance and Ruminations  Panic:  Palpitations, Triggers needed to make an appt  and felt like I needed to get up and move   Post Traumatic Stress Disorder:  Experienced traumatic event cancer 2x, Avoids traumatic stimuli, Increased arousal and in the 5 years after cancer experienced these symptoms    Eating Disorder: No Symptoms  ADD / ADHD:  Inattentive and Interrupts  Conduct Disorder: No symptoms  Autism Spectrum Disorder: No symptoms  Obsessive Compulsive Disorder: No Symptoms    Patient reports the following compulsive behaviors and treatment history: none .      Diagnostic Criteria:   A. Excessive anxiety and worry about a number of events or activities (such as work or school performance).   B. The person finds it difficult to control the worry.  C. Select 3 or more symptoms (required for diagnosis). Only one item is required in children.   - Restlessness or feeling keyed up or on edge.    - Being easily fatigued.    - Difficulty concentrating or mind going blank.    - Irritability.    - Muscle tension.    - Sleep disturbance (difficulty falling or staying asleep, or restless unsatisfying sleep).   F. The disturbance is not due to the direct physiological effects of a substance (e.g., a drug of abuse, a medication) or a general medical condition (e.g., hyperthyroidism) and does not occur exclusively during a Mood Disorder, a Psychotic Disorder, or a Pervasive Developmental Disorder.    Functional  Status:  Patient reports the following functional impairments: social interactions and work / vocational responsibilities.     WHODAS: No flowsheet data found.  Nonprogrammatic care:  Patient is requesting basic services to address current mental health concerns.    Clinical Summary:  1. Reason for assessment: pain in chest/throat and cough due to deadlines  .  2. Psychosocial, Cultural and Contextual Factors: work and personal life related deadlines  .  3. Principal DSM5 Diagnoses  (Sustained by DSM5 Criteria Listed Above):   300.02 (F41.1) Generalized Anxiety Disorder.  4. Other Diagnoses that is relevant to services:   none  5. Provisional Diagnosis:  300.02 (F41.1) Generalized Anxiety Disorder as evidenced by symptom report .  6. Prognosis: Expect Improvement and Relieve Acute Symptoms.  7. Likely consequences of symptoms if not treated: worsening symptoms.  8. Client strengths include:  educated, empathetic, employed, has a previous history of therapy, intelligent, motivated, responsible parent, support of family, friends and providers, willing to relate to others and work history .     Recommendations:     1. Plan for Safety and Risk Management:   Recommended that patient call 911 or go to the local ED should there be a change in any of these risk factors..          Report to child / adult protection services was NA.     2. Patient's identified mental health concerns with a cultural influence will be addressed by provider being mindful of cultural impact on mental health .     3. Initial Treatment will focus on:    Anxiety -  .     4. Resources/Service Plan:    services are not indicated.   Modifications to assist communication are not indicated.   Additional disability accommodations are not indicated.      5. Collaboration:   Collaboration / coordination of treatment will be initiated with the following  support professionals: primary care physician.      6.  Referrals:   The following referral(s)  will be initiated: none . Next Scheduled Appointment: July 7.     A Release of Information has been obtained for the following: none .    7. JULIO:    JULIO:  Discussed the general effects of drugs and alcohol on health and well-being. Provider gave patient printed information about the effects of chemical use on their health and well being. Recommendations:  none .     8. Records:   These were reviewed at time of assessment.   Information in this assessment was obtained from the medical record and  provided by patient who is a good historian.    Patient will have open access to their mental health medical record.        Provider Name/ Credentials:  Sarah Morocho [facility]  June 22, 2021

## 2021-07-07 ENCOUNTER — VIRTUAL VISIT (OUTPATIENT)
Dept: PSYCHOLOGY | Facility: CLINIC | Age: 56
End: 2021-07-07
Payer: COMMERCIAL

## 2021-07-07 DIAGNOSIS — F41.1 GENERALIZED ANXIETY DISORDER: Primary | ICD-10-CM

## 2021-07-07 PROCEDURE — 90834 PSYTX W PT 45 MINUTES: CPT | Mod: 95 | Performed by: COUNSELOR

## 2021-07-07 NOTE — PROGRESS NOTES
Progress Note    Patient Name: Faith Oakes  Date: 7/7/2021           Service Type: Individual      Session Start Time: 9:02am  Session End Time: 9:48am     Session Length: 46 mins     Session #: 1    Attendees: Client attended alone    Service Modality:  Video Visit:      Provider verified identity through the following two step process.  Patient provided:  Patient is known previously to provider    Telemedicine Visit: The patient's condition can be safely assessed and treated via synchronous audio and visual telemedicine encounter.      Reason for Telemedicine Visit: Services only offered telehealth    Originating Site (Patient Location): Patient's home    Distant Site (Provider Location): Provider Remote Setting- Home Office    Consent:  The patient/guardian has verbally consented to: the potential risks and benefits of telemedicine (video visit) versus in person care; bill my insurance or make self-payment for services provided; and responsibility for payment of non-covered services.     Patient would like the video invitation sent by:  My Chart    Mode of Communication:  Video Conference via Amwell    As the provider I attest to compliance with applicable laws and regulations related to telemedicine.     Treatment Plan Last Reviewed: 7/7/2021    PHQ-9 / CYNDI-7 : in flowsheet    DATA  Interactive Complexity: No  Crisis: No       Progress Since Last Session (Related to Symptoms / Goals / Homework):   Symptoms: No change      Homework: Completed in session      Episode of Care Goals: No improvement - PREPARATION (Decided to change - considering how); Intervened by negotiating a change plan and determining options / strategies for behavior change, identifying triggers, exploring social supports, and working towards setting a date to begin behavior change     Current / Ongoing Stressors and Concerns:   Work, past health concerns, deadlines      Treatment Objective(s)  Addressed in This Session:   practice deep breathing at least 1x a day  Mind/body connection      Intervention:   Solution Focused: gathering further information for tx planning         ASSESSMENT: Current Emotional / Mental Status (status of significant symptoms):   Risk status (Self / Other harm or suicidal ideation)   Patient denies current fears or concerns for personal safety.   Patient denies current or recent suicidal ideation or behaviors.   Patient denies current or recent homicidal ideation or behaviors.   Patient denies current or recent self injurious behavior or ideation.   Patient denies other safety concerns.   Patient reports there has been no change in risk factors since their last session.     Patient reports there has been no change in protective factors since their last session.     Recommended that patient call 911 or go to the local ED should there be a change in any of these risk factors.     Appearance:   Appropriate    Eye Contact:   Good    Psychomotor Behavior: Normal    Attitude:   Cooperative    Orientation:   All   Speech    Rate / Production: Normal     Volume:  Normal    Mood:    Anxious  Normal   Affect:    Appropriate    Thought Content:  Clear    Thought Form:  Coherent    Insight:    Fair      Medication Review:   No current psychiatric medications prescribed     Medication Compliance:   NA     Changes in Health Issues:   Yes: stomach acid, Associated Psychological Distress     Chemical Use Review:   Substance Use: Chemical use reviewed, no active concerns identified      Tobacco Use: No current tobacco use.      Diagnosis:  1. Generalized anxiety disorder        Collateral Reports Completed:   Not Applicable    PLAN: (Patient Tasks / Therapist Tasks / Other)  Provider assigned client to document symptoms and surrounding content  Provider assigned client to try 4-8 breathing         Sarah Morocho, [facility] 7/7/2021                                                            ______________________________________________________________________    Treatment Plan    Patient's Name: Faith Oakes  YOB: 1965    Date: 7/7/2021      DSM5 Diagnoses: 300.02 (F41.1) Generalized Anxiety Disorder  Psychosocial / Contextual Factors: deadlines, work related stressors, health of self (past cancer), health of parents  WHODAS: 17    Referral / Collaboration:  Referral to another professional/service is not indicated at this time.    Anticipated number of session or this episode of care: 8-12      MeasurableTreatment Goal(s) related to diagnosis / functional impairment(s)  Goal 1: Patient will report a 50% reduction in physical symptoms (curretly:)    I will know I've met my goal when physical symptoms lessen or are gone and I can stop medication. I will know what to do to help my symptoms.      Objective #A (Patient Action)    Patient will identify   initial signs or symptoms of anxiety.  Status: New - Date: 7/7/2021     Intervention(s)  Therapist will teach emotional recognition/identification.  .    Objective #B  Patient will use at least 2 coping skills for anxiety management in the next 4 weeks.  Status: New - Date: 7/7/2021     Intervention(s)  Therapist will teach emotional regulation skills.  .    Objective #C  Patient will use relaxation strategies 1x times per day to reduce the physical symptoms of anxiety.  Status: New - Date: 7/7/2021     Intervention(s)  Therapist will teach relaxation techniques.    Patient has reviewed and agreed to the above plan.      Sarah Morocho, [facility]  July 7, 2021

## 2021-07-20 ENCOUNTER — VIRTUAL VISIT (OUTPATIENT)
Dept: PSYCHOLOGY | Facility: CLINIC | Age: 56
End: 2021-07-20
Payer: COMMERCIAL

## 2021-07-20 DIAGNOSIS — F41.1 GENERALIZED ANXIETY DISORDER: Primary | ICD-10-CM

## 2021-07-20 PROCEDURE — 90834 PSYTX W PT 45 MINUTES: CPT | Mod: 95 | Performed by: COUNSELOR

## 2021-07-20 ASSESSMENT — ANXIETY QUESTIONNAIRES
6. BECOMING EASILY ANNOYED OR IRRITABLE: NOT AT ALL
1. FEELING NERVOUS, ANXIOUS, OR ON EDGE: SEVERAL DAYS
7. FEELING AFRAID AS IF SOMETHING AWFUL MIGHT HAPPEN: NOT AT ALL
5. BEING SO RESTLESS THAT IT IS HARD TO SIT STILL: NOT AT ALL
3. WORRYING TOO MUCH ABOUT DIFFERENT THINGS: NOT AT ALL
2. NOT BEING ABLE TO STOP OR CONTROL WORRYING: SEVERAL DAYS
GAD7 TOTAL SCORE: 3

## 2021-07-20 ASSESSMENT — PATIENT HEALTH QUESTIONNAIRE - PHQ9
5. POOR APPETITE OR OVEREATING: SEVERAL DAYS
SUM OF ALL RESPONSES TO PHQ QUESTIONS 1-9: 1

## 2021-07-20 NOTE — PROGRESS NOTES
Progress Note    Patient Name: Faith Oakes  Date: 7/20/2021           Service Type: Individual      Session Start Time: 10:02am  Session End Time: 10:50am     Session Length: 48 mins     Session #: 2    Attendees: Client attended alone    Service Modality:  Video Visit:      Provider verified identity through the following two step process.  Patient provided:  Patient is known previously to provider    Telemedicine Visit: The patient's condition can be safely assessed and treated via synchronous audio and visual telemedicine encounter.      Reason for Telemedicine Visit: Services only offered telehealth    Originating Site (Patient Location): Patient's home    Distant Site (Provider Location): Provider Remote Setting- Home Office    Consent:  The patient/guardian has verbally consented to: the potential risks and benefits of telemedicine (video visit) versus in person care; bill my insurance or make self-payment for services provided; and responsibility for payment of non-covered services.     Patient would like the video invitation sent by:  My Chart    Mode of Communication:  Video Conference via Amwell    As the provider I attest to compliance with applicable laws and regulations related to telemedicine.     Treatment Plan Last Reviewed: July 2020  PHQ-9 / CYNDI-7 : 1/3    DATA  Interactive Complexity: No  Crisis: No       Progress Since Last Session (Related to Symptoms / Goals / Homework):   Symptoms: Improving Client took note of when symptoms occur. Reported being able to have a synptom free day on a day that might have normally caused symptoms by using logical planning to override emotional illogical thoughts occurring     Homework: Achieved / completed to satisfaction  Completed in session      Episode of Care Goals: Satisfactory progress - ACTION (Actively working towards change); Intervened by reinforcing change plan / affirming steps taken     Current /  Ongoing Stressors and Concerns:   Decision to keep working in new role or continue onto halfway plan    Physical symptom of chest tightness and burning that comes along with anxiety/worry/stress   Prioritizing how she would like her lfie to look going forward into halfway      Treatment Objective(s) Addressed in This Session:   identify   fears / thoughts that contribute to feeling anxious  Practice breathing 1x per day   Education on brain functioning and emotional responses      Intervention:   CBT: thoughts>feelings>actions connection, coping skills as behavioral habit   Emotion Focused Therapy: recognition of emotions         ASSESSMENT: Current Emotional / Mental Status (status of significant symptoms):   Risk status (Self / Other harm or suicidal ideation)   Patient denies current fears or concerns for personal safety.   Patient denies current or recent suicidal ideation or behaviors.   Patient denies current or recent homicidal ideation or behaviors.   Patient denies current or recent self injurious behavior or ideation.   Patient denies other safety concerns.   Patient reports there has been no change in risk factors since their last session.     Patient reports there has been no change in protective factors since their last session.     Recommended that patient call 911 or go to the local ED should there be a change in any of these risk factors.     Appearance:   Appropriate    Eye Contact:   Good    Psychomotor Behavior: Normal    Attitude:   Cooperative    Orientation:   All   Speech    Rate / Production: Normal     Volume:  Normal    Mood:    Normal   Affect:    Appropriate    Thought Content:  Clear    Thought Form:  Coherent  Logical    Insight:    Good  and Fair      Medication Review:   No current psychiatric medications prescribed     Medication Compliance:   NA     Changes in Health Issues:   None reported     Chemical Use Review:   Substance Use: Chemical use reviewed, no active concerns  identified      Tobacco Use: No current tobacco use.      Diagnosis:  1. Generalized anxiety disorder        Collateral Reports Completed:   Not Applicable    PLAN: (Patient Tasks / Therapist Tasks / Other)  Provider assigned client to consider current values aligning with what she chooses for work/returement options  Provider assigned client to notice thoughts that are antecedent to emotions   Provider assigned client to practice breathing 1x a day       Distractions, thought change/stop, card sort vidhya Morocho, [facility] 7/20/2021                                                           ______________________________________________________________________     Treatment Plan     Patient's Name: Faith Oakes                 YOB: 1965     Date: 7/7/2021        DSM5 Diagnoses: 300.02 (F41.1) Generalized Anxiety Disorder  Psychosocial / Contextual Factors: deadlines, work related stressors, health of self (past cancer), health of parents  WHODAS: 17     Referral / Collaboration:  Referral to another professional/service is not indicated at this time.     Anticipated number of session or this episode of care: 8-12        MeasurableTreatment Goal(s) related to diagnosis / functional impairment(s)  Goal 1: Patient will report a 50% reduction in physical symptoms (currently:1x a week)    I will know I've met my goal when physical symptoms lessen or are gone and I can stop medication. I will know what to do to help my symptoms.       Objective #A (Patient Action)                          Patient will identify   initial signs or symptoms of anxiety.  Status: New - Date: 7/7/2021      Intervention(s)  Therapist will teach emotional recognition/identification.  .     Objective #B  Patient will use at least 2 coping skills for anxiety management in the next 4 weeks.  Status: New - Date: 7/7/2021      Intervention(s)  Therapist will teach emotional regulation skills.  .     Objective #C  Patient will use  relaxation strategies 1x times per day to reduce the physical symptoms of anxiety.  Status: New - Date: 7/7/2021      Intervention(s)  Therapist will teach relaxation techniques.     Patient has reviewed and agreed to the above plan.        Sarah Mor[facility]o, [facility]                  July 7, 2021

## 2021-07-21 ASSESSMENT — ANXIETY QUESTIONNAIRES: GAD7 TOTAL SCORE: 3

## 2021-08-18 ENCOUNTER — VIRTUAL VISIT (OUTPATIENT)
Dept: PSYCHOLOGY | Facility: CLINIC | Age: 56
End: 2021-08-18
Payer: COMMERCIAL

## 2021-08-18 DIAGNOSIS — F41.1 GENERALIZED ANXIETY DISORDER: Primary | ICD-10-CM

## 2021-08-18 PROCEDURE — 90834 PSYTX W PT 45 MINUTES: CPT | Mod: 95 | Performed by: COUNSELOR

## 2021-08-18 ASSESSMENT — PATIENT HEALTH QUESTIONNAIRE - PHQ9
5. POOR APPETITE OR OVEREATING: SEVERAL DAYS
SUM OF ALL RESPONSES TO PHQ QUESTIONS 1-9: 2

## 2021-08-18 ASSESSMENT — ANXIETY QUESTIONNAIRES
6. BECOMING EASILY ANNOYED OR IRRITABLE: NOT AT ALL
GAD7 TOTAL SCORE: 5
2. NOT BEING ABLE TO STOP OR CONTROL WORRYING: SEVERAL DAYS
3. WORRYING TOO MUCH ABOUT DIFFERENT THINGS: SEVERAL DAYS
5. BEING SO RESTLESS THAT IT IS HARD TO SIT STILL: NOT AT ALL
7. FEELING AFRAID AS IF SOMETHING AWFUL MIGHT HAPPEN: NOT AT ALL
1. FEELING NERVOUS, ANXIOUS, OR ON EDGE: MORE THAN HALF THE DAYS

## 2021-08-18 NOTE — PROGRESS NOTES
Progress Note    Patient Name: Faith Oakes  Date: 8/18/2021           Service Type: Individual      Session Start Time: 9:03am  Session End Time: 9:48am     Session Length: 45 mins     Session #: 4    Attendees: Client attended alone    Service Modality:  Video Visit:      Provider verified identity through the following two step process.  Patient provided:  Patient is known previously to provider    Telemedicine Visit: The patient's condition can be safely assessed and treated via synchronous audio and visual telemedicine encounter.      Reason for Telemedicine Visit: Services only offered telehealth    Originating Site (Patient Location): Patient's home    Distant Site (Provider Location): Provider Remote Setting- Home Office    Consent:  The patient/guardian has verbally consented to: the potential risks and benefits of telemedicine (video visit) versus in person care; bill my insurance or make self-payment for services provided; and responsibility for payment of non-covered services.     Patient would like the video invitation sent by:  My Chart    Mode of Communication:  Video Conference via Amwell    As the provider I attest to compliance with applicable laws and regulations related to telemedicine.     Treatment Plan Last Reviewed: 7/721  PHQ-9 / CYNDI-7 : 2/5    DATA  Interactive Complexity: No  Crisis: No       Progress Since Last Session (Related to Symptoms / Goals / Homework):   Symptoms: assessment scores remain same, clients insight into anxiety cause and thoughts increasing     Homework: Achieved / completed to satisfaction      Episode of Care Goals: Satisfactory progress - ACTION (Actively working towards change); Intervened by reinforcing change plan / affirming steps taken     Current / Ongoing Stressors and Concerns:   Physical symptom of chest tightness and burning that comes along with anxiety/worry/stress   Work/shelter  "   \"perfectionism\"     Treatment Objective(s) Addressed in This Session:   use distraction each time intrusive worry surfaces  Values vs time spent QOL exercises      Intervention:   CBT: vagus nerve and humming calming  Emotion Focused Therapy: supportive listening  Solution Focused: values card sort         ASSESSMENT: Current Emotional / Mental Status (status of significant symptoms):   Risk status (Self / Other harm or suicidal ideation)   Patient denies current fears or concerns for personal safety.   Patient denies current or recent suicidal ideation or behaviors.   Patient denies current or recent homicidal ideation or behaviors.   Patient denies current or recent self injurious behavior or ideation.   Patient denies other safety concerns.   Patient reports there has been no change in risk factors since their last session.     Patient reports there has been no change in protective factors since their last session.     Recommended that patient call 911 or go to the local ED should there be a change in any of these risk factors.     Appearance:   Appropriate    Eye Contact:   Good    Psychomotor Behavior: Normal    Attitude:   Cooperative    Orientation:   All   Speech    Rate / Production: Normal     Volume:  Normal    Mood:    Normal   Affect:    Appropriate    Thought Content:  Clear  Rumination    Thought Form:  Coherent  Logical    Insight:    Good  and Fair      Medication Review:   No current psychiatric medications prescribed     Medication Compliance:   NA     Changes in Health Issues:   Yes: gastro, Associated Psychological Distress     Chemical Use Review:   Substance Use: Chemical use reviewed, no active concerns identified      Tobacco Use: No current tobacco use.      Diagnosis:  1. Generalized anxiety disorder        Collateral Reports Completed:   Not Applicable    PLAN: (Patient Tasks / Therapist Tasks / Other)  Provider assigned client to look at Doc hand out  Provider assigned client to make " list of distractions       Thought record , facts   YESICA Bautista 8/18/2021                                                           ______________________________________________________________________     Treatment Plan     Patient's Name: Faith Oakes                 YOB: 1965     Date: 7/7/2021        DSM5 Diagnoses: 300.02 (F41.1) Generalized Anxiety Disorder  Psychosocial / Contextual Factors: deadlines, work related stressors, health of self (past cancer), health of parents  WHODAS: 17     Referral / Collaboration:  Referral to another professional/service is not indicated at this time.     Anticipated number of session or this episode of care: 8-12        MeasurableTreatment Goal(s) related to diagnosis / functional impairment(s)  Goal 1: Patient will report a 50% reduction in physical symptoms (currently:1x a week)    I will know I've met my goal when physical symptoms lessen or are gone and I can stop medication. I will know what to do to help my symptoms.       Objective #A (Patient Action)                          Patient will identify   initial signs or symptoms of anxiety.  Status: New - Date: 7/7/2021      Intervention(s)  Therapist will teach emotional recognition/identification.  .     Objective #B  Patient will use at least 2 coping skills for anxiety management in the next 4 weeks.  Status: New - Date: 7/7/2021      Intervention(s)  Therapist will teach emotional regulation skills.  .     Objective #C  Patient will use relaxation strategies 1x times per day to reduce the physical symptoms of anxiety.  Status: New - Date: 7/7/2021      Intervention(s)  Therapist will teach relaxation techniques.     Patient has reviewed and agreed to the above plan.        YESICA Price                  July 7, 2021

## 2021-08-19 ASSESSMENT — ANXIETY QUESTIONNAIRES: GAD7 TOTAL SCORE: 5

## 2021-09-02 ENCOUNTER — VIRTUAL VISIT (OUTPATIENT)
Dept: PSYCHOLOGY | Facility: CLINIC | Age: 56
End: 2021-09-02
Payer: COMMERCIAL

## 2021-09-02 DIAGNOSIS — F41.1 GENERALIZED ANXIETY DISORDER: Primary | ICD-10-CM

## 2021-09-02 PROCEDURE — 90834 PSYTX W PT 45 MINUTES: CPT | Mod: 95 | Performed by: COUNSELOR

## 2021-09-02 NOTE — PROGRESS NOTES
Progress Note    Patient Name: Faith Oakes  Date: 9/2/2021           Service Type: Individual      Session Start Time: 9:02am  Session End Time: 9:50am     Session Length: 48 mins    Session #: 5    Attendees: Client attended alone    Service Modality:  Video Visit:      Provider verified identity through the following two step process.  Patient provided:  Patient is known previously to provider    Telemedicine Visit: The patient's condition can be safely assessed and treated via synchronous audio and visual telemedicine encounter.      Reason for Telemedicine Visit: Services only offered telehealth    Originating Site (Patient Location): Patient's home    Distant Site (Provider Location): Provider Remote Setting- Home Office    Consent:  The patient/guardian has verbally consented to: the potential risks and benefits of telemedicine (video visit) versus in person care; bill my insurance or make self-payment for services provided; and responsibility for payment of non-covered services.     Patient would like the video invitation sent by:  My Chart    Mode of Communication:  Video Conference via Amwell    As the provider I attest to compliance with applicable laws and regulations related to telemedicine.     Treatment Plan Last Reviewed: 7/7/21  PHQ-9 / CYNDI-7 : in flowsheet    DATA  Interactive Complexity: No  Crisis: No       Progress Since Last Session (Related to Symptoms / Goals / Homework):   Symptoms: some increased physical symptoms but feeling enjoyment in prison     Homework: Achieved / completed to satisfaction      Episode of Care Goals: Satisfactory progress - ACTION (Actively working towards change); Intervened by reinforcing change plan / affirming steps taken     Current / Ongoing Stressors and Concerns:   Physical symptom of chest tightness and burning that comes along with anxiety/worry/stress              Work/prison                "\"perfectionism\"     Treatment Objective(s) Addressed in This Session:   use cognitive strategies identified in therapy to challenge anxious thoughts  Food journal      Intervention:   CBT: fact checking thoughts   Solution Focused: food journal for symptom connection         ASSESSMENT: Current Emotional / Mental Status (status of significant symptoms):   Risk status (Self / Other harm or suicidal ideation)   Patient denies current fears or concerns for personal safety.   Patient denies current or recent suicidal ideation or behaviors.   Patient denies current or recent homicidal ideation or behaviors.   Patient denies current or recent self injurious behavior or ideation.   Patient denies other safety concerns.   Patient reports there has been no change in risk factors since their last session.     Patient reports there has been no change in protective factors since their last session.     Recommended that patient call 911 or go to the local ED should there be a change in any of these risk factors.     Appearance:   Appropriate    Eye Contact:   Good    Psychomotor Behavior: Normal    Attitude:   Cooperative    Orientation:   All   Speech    Rate / Production: Normal     Volume:  Normal    Mood:    Normal   Affect:    Appropriate    Thought Content:  Clear  Rumination    Thought Form:  Coherent  Logical    Insight:    Good  and Fair      Medication Review:   No current psychiatric medications prescribed     Medication Compliance:   NA     Changes in Health Issues:   Yes: stomach acid , Associated Psychological Distress     Chemical Use Review:   Substance Use: Chemical use reviewed, no active concerns identified      Tobacco Use: No current tobacco use.      Diagnosis:  1. Generalized anxiety disorder        Collateral Reports Completed:   Not Applicable    PLAN: (Patient Tasks / Therapist Tasks / Other)  Provider assigned client to use facts vs emotions  Provider assigned client to use food journal, consider " trying without coffee       Thought record   YESICA Bautista 9/2/2021                                                     ______________________________________________________________________     Treatment Plan     Patient's Name: Faith Oakes                 YOB: 1965     Date: 7/7/2021        DSM5 Diagnoses: 300.02 (F41.1) Generalized Anxiety Disorder  Psychosocial / Contextual Factors: deadlines, work related stressors, health of self (past cancer), health of parents  WHODAS: 17     Referral / Collaboration:  Referral to another professional/service is not indicated at this time.     Anticipated number of session or this episode of care: 8-12        MeasurableTreatment Goal(s) related to diagnosis / functional impairment(s)  Goal 1: Patient will report a 50% reduction in physical symptoms (currently:1x a week)    I will know I've met my goal when physical symptoms lessen or are gone and I can stop medication. I will know what to do to help my symptoms.       Objective #A (Patient Action)                          Patient will identify   initial signs or symptoms of anxiety.  Status: New - Date: 7/7/2021      Intervention(s)  Therapist will teach emotional recognition/identification.  .     Objective #B  Patient will use at least 2 coping skills for anxiety management in the next 4 weeks.  Status: New - Date: 7/7/2021      Intervention(s)  Therapist will teach emotional regulation skills.  .     Objective #C  Patient will use relaxation strategies 1x times per day to reduce the physical symptoms of anxiety.  Status: New - Date: 7/7/2021      Intervention(s)  Therapist will teach relaxation techniques.     Patient has reviewed and agreed to the above plan.        YESICA Price                  July 7, 2021

## 2021-09-23 ENCOUNTER — VIRTUAL VISIT (OUTPATIENT)
Dept: PSYCHOLOGY | Facility: CLINIC | Age: 56
End: 2021-09-23
Payer: COMMERCIAL

## 2021-09-23 DIAGNOSIS — F41.1 GENERALIZED ANXIETY DISORDER: Primary | ICD-10-CM

## 2021-09-23 PROCEDURE — 90834 PSYTX W PT 45 MINUTES: CPT | Mod: 95 | Performed by: COUNSELOR

## 2021-09-23 ASSESSMENT — PATIENT HEALTH QUESTIONNAIRE - PHQ9
5. POOR APPETITE OR OVEREATING: SEVERAL DAYS
SUM OF ALL RESPONSES TO PHQ QUESTIONS 1-9: 1

## 2021-09-23 ASSESSMENT — ANXIETY QUESTIONNAIRES
1. FEELING NERVOUS, ANXIOUS, OR ON EDGE: SEVERAL DAYS
3. WORRYING TOO MUCH ABOUT DIFFERENT THINGS: SEVERAL DAYS
7. FEELING AFRAID AS IF SOMETHING AWFUL MIGHT HAPPEN: NOT AT ALL
2. NOT BEING ABLE TO STOP OR CONTROL WORRYING: NOT AT ALL
5. BEING SO RESTLESS THAT IT IS HARD TO SIT STILL: NOT AT ALL
6. BECOMING EASILY ANNOYED OR IRRITABLE: NOT AT ALL
GAD7 TOTAL SCORE: 3

## 2021-09-23 NOTE — PROGRESS NOTES
"                                           Progress Note    Patient Name: Faith Oakes  Date: 9/23/2021           Service Type: Individual      Session Start Time: 10:02am  Session End Time: 10:48am     Session Length: 46 mins     Session #: 6    Attendees: Client attended alone    Service Modality:  Video Visit:      Provider verified identity through the following two step process.  Patient provided:  Patient is known previously to provider    Telemedicine Visit: The patient's condition can be safely assessed and treated via synchronous audio and visual telemedicine encounter.      Reason for Telemedicine Visit: Patient has requested telehealth visit    Originating Site (Patient Location): Patient's home    Distant Site (Provider Location): Provider Remote Setting- Home Office    Consent:  The patient/guardian has verbally consented to: the potential risks and benefits of telemedicine (video visit) versus in person care; bill my insurance or make self-payment for services provided; and responsibility for payment of non-covered services.     Patient would like the video invitation sent by:  My Chart    Mode of Communication:  Video Conference via Amwell    As the provider I attest to compliance with applicable laws and regulations related to telemedicine.     Treatment Plan Last Reviewed: 7/7/21  PHQ-9 / CYNDI-7 : 1/3    DATA  Interactive Complexity: No  Crisis: No       Progress Since Last Session (Related to Symptoms / Goals / Homework):   Symptoms: Improving less symptoms     Homework: Achieved / completed to satisfaction      Episode of Care Goals: Satisfactory progress - ACTION (Actively working towards change); Intervened by reinforcing change plan / affirming steps taken     Current / Ongoing Stressors and Concerns:    Physical symptom of chest tightness and burning that comes along with anxiety/worry/stress              Work/assisted               \"perfectionism\"     Treatment Objective(s) Addressed in " This Session:   use cognitive strategies identified in therapy to challenge anxious thoughts  Identify negative self-talk and behaviors: challenge core beliefs, myths, and actions  Improve concentration, focus, and mindfulness in daily activities   Decision making objectives     Intervention:   CBT: socratic questioning   Emotion Focused Therapy: supportive listening, emotional processing         ASSESSMENT: Current Emotional / Mental Status (status of significant symptoms):   Risk status (Self / Other harm or suicidal ideation)   Patient denies current fears or concerns for personal safety.   Patient denies current or recent suicidal ideation or behaviors.   Patient denies current or recent homicidal ideation or behaviors.   Patient denies current or recent self injurious behavior or ideation.   Patient denies other safety concerns.   Patient reports there has been no change in risk factors since their last session.     Patient reports there has been no change in protective factors since their last session.     Recommended that patient call 911 or go to the local ED should there be a change in any of these risk factors.     Appearance:   Appropriate    Eye Contact:   Good    Psychomotor Behavior: Normal    Attitude:   Cooperative    Orientation:   All   Speech    Rate / Production: Normal     Volume:  Normal    Mood:    Normal   Affect:    Appropriate    Thought Content:  Clear  Rumination    Thought Form:  Coherent  Logical    Insight:    Good  and Fair      Medication Review:   No current psychiatric medications prescribed     Medication Compliance:   NA     Changes in Health Issues:   None reported     Chemical Use Review:   Substance Use: Chemical use reviewed, no active concerns identified      Tobacco Use: No current tobacco use.      Diagnosis:  1. Generalized anxiety disorder        Collateral Reports Completed:   Not Applicable    PLAN: (Patient Tasks / Therapist Tasks / Other)  Provider assigtned client  to speak with PCP if she would like to taper med  Provider assigned client to cont use of self talk   Provider assigned client to consider discussions regarding return to work         YESICA Bautista 9/23/2021                                                           ______________________________________________________________________     Treatment Plan     Patient's Name: Faith Oakes                 YOB: 1965     Date: 7/7/2021        DSM5 Diagnoses: 300.02 (F41.1) Generalized Anxiety Disorder  Psychosocial / Contextual Factors: deadlines, work related stressors, health of self (past cancer), health of parents  WHODAS: 17     Referral / Collaboration:  Referral to another professional/service is not indicated at this time.     Anticipated number of session or this episode of care: 8-12        MeasurableTreatment Goal(s) related to diagnosis / functional impairment(s)  Goal 1: Patient will report a 50% reduction in physical symptoms (currently:1x a week)    I will know I've met my goal when physical symptoms lessen or are gone and I can stop medication. I will know what to do to help my symptoms.       Objective #A (Patient Action)                          Patient will identify   initial signs or symptoms of anxiety.  Status: New - Date: 7/7/2021      Intervention(s)  Therapist will teach emotional recognition/identification.  .     Objective #B  Patient will use at least 2 coping skills for anxiety management in the next 4 weeks.  Status: New - Date: 7/7/2021      Intervention(s)  Therapist will teach emotional regulation skills.  .     Objective #C  Patient will use relaxation strategies 1x times per day to reduce the physical symptoms of anxiety.  Status: New - Date: 7/7/2021      Intervention(s)  Therapist will teach relaxation techniques.     Patient has reviewed and agreed to the above plan.        YESICA Price                  July 7, 2021

## 2021-09-24 ASSESSMENT — ANXIETY QUESTIONNAIRES: GAD7 TOTAL SCORE: 3

## 2021-10-23 ENCOUNTER — HEALTH MAINTENANCE LETTER (OUTPATIENT)
Age: 56
End: 2021-10-23

## 2021-10-28 ENCOUNTER — VIRTUAL VISIT (OUTPATIENT)
Dept: FAMILY MEDICINE | Facility: CLINIC | Age: 56
End: 2021-10-28
Payer: COMMERCIAL

## 2021-10-28 DIAGNOSIS — R07.89 CHEST TIGHTNESS: Primary | ICD-10-CM

## 2021-10-28 DIAGNOSIS — K21.9 GASTROESOPHAGEAL REFLUX DISEASE WITHOUT ESOPHAGITIS: ICD-10-CM

## 2021-10-28 PROCEDURE — 99214 OFFICE O/P EST MOD 30 MIN: CPT | Mod: 95 | Performed by: FAMILY MEDICINE

## 2021-10-28 RX ORDER — PANTOPRAZOLE SODIUM 40 MG/1
40 TABLET, DELAYED RELEASE ORAL DAILY
Qty: 30 TABLET | Refills: 0 | Status: SHIPPED | OUTPATIENT
Start: 2021-10-28 | End: 2021-10-28

## 2021-10-28 RX ORDER — CLONAZEPAM 0.5 MG/1
0.5 TABLET ORAL ONCE
Qty: 1 TABLET | Refills: 0 | Status: SHIPPED | OUTPATIENT
Start: 2021-10-28 | End: 2023-09-12

## 2021-10-28 NOTE — PROGRESS NOTES
Faith is a 56 year old who is being evaluated via a billable video visit.      How would you like to obtain your AVS? MyChart  If the video visit is dropped, the invitation should be resent by: Send to e-mail at: [email or web address]  Will anyone else be joining your video visit? No       Video Start Time: 9:54 AM    Assessment & Plan     Chest tightness - unclear, could be related to ongoing GERD symptoms versus increased anxiety versus cardiopulmonary cause. Less likely the latter as this is her only symptom. Will trial 1 tablet of clonazepam when she's having her symptoms to see if this changes them at all.   - clonazePAM (KLONOPIN) 0.5 MG tablet; Take 1 tablet (0.5 mg) by mouth once for 1 dose    Gastroesophageal reflux disease without esophagitis - her current symptoms aren't lining up with typical GERD, but she has had improvement in her GERD symptoms with PPIs in the past. Will trial 1 month of increased PPI dose. Also suggested GI consultation as we've struggled to get consistent control of her symptoms. She agrees. If no benefit from either the PPI or the benzo, consider in office visit and possibly chest imaging.  - Adult Gastro Ref - Consult Only; Future  - pantoprazole (PROTONIX) 40 MG EC tablet; Take 1 tablet (40 mg) by mouth daily      Return in about 1 year (around 10/28/2022) for as needed.    Jazzy Camargo MD  [facility]   Faith is a 56 year old who presents for the following health issues     History of Present Illness       She eats 2-3 servings of fruits and vegetables daily.She consumes 1 sweetened beverage(s) daily.She exercises with enough effort to increase her heart rate 30 to 60 minutes per day.  She exercises with enough effort to increase her heart rate 5 days per week.   She is taking medications regularly.       Currently taking protonix 20 mg daily for GERD. Has been on PPIs for the last few years.     Was on vacation a month ago, ate  a meal and symptoms began. Was having some dyspnea initially, this has since resolved.   Reports she still feels tightness and burning in her chest.   Not changed with eating.   Symptoms present all day.   No abdominal pain or epigastric discomfort.   Reports better with laying down and chewing cinnamon gum.     At baseline, no palpitations or dyspnea.   No sensation of throat closing, perioral tingling, headache, increased heart rate.     Consulting with therapy, notes CYNDI symptoms have improved. Feeling good about this.       Review of Systems   Constitutional, HEENT, cardiovascular, pulmonary, gi and gu systems are negative, except as otherwise noted.      Objective       Vitals:  No vitals were obtained today due to virtual visit.    Physical Exam   GENERAL: Healthy, alert and no distress   EYES: Eyes grossly normal to inspection.  No discharge or erythema, or obvious scleral/conjunctival abnormalities.  RESP: No audible wheeze, cough, or visible cyanosis.  No visible retractions or increased work of breathing.    SKIN: Visible skin clear. No significant rash, abnormal pigmentation or lesions.  NEURO: Cranial nerves grossly intact.  Mentation and speech appropriate for age.  PSYCH: Mentation appears normal, affect normal/bright, judgement and insight intact, normal speech and appearance well-groomed.          Video-Visit Details    Type of service:  Video Visit    Video End Time:10:07 AM    Originating Location (pt. Location): Home    Distant Location (provider location):  [facility]     Platform used for Video Visit: "[employer]"

## 2021-10-30 ENCOUNTER — TELEPHONE (OUTPATIENT)
Dept: FAMILY MEDICINE | Facility: CLINIC | Age: 56
End: 2021-10-30

## 2021-10-30 NOTE — TELEPHONE ENCOUNTER
Prior Authorization Retail Medication Request    Medication/Dose: pantoprazole (PROTONIX) 40 MG EC tablet  ICD code (if different than what is on RX):  Previously Tried and Failed:  Rationale:    Insurance Name: UNKNOWN  Insurance ID: UNKNOWN    Pharmacy Information (if different than what is on RX)  Name[facility]  Phone: [phone]    Please include previous medications tried and failed.  Please ask insurance for medications on formulary.

## 2021-11-01 NOTE — TELEPHONE ENCOUNTER
Central Prior Authorization Team   Phone: [phone]    [place] Initiation    Medication: pantoprazole (PROTONIX) 40 MG EC tablet INS HAS QTY LIMITAIONS PER YEAR NEED [place]  Insurance Company: [facility] [employer] - Phone [phone] Fax [phone]  Pharmacy Filling the Rx: [facility] [address] [place] [id number] [address] LANGE  Filling Pharmacy Phone: [phone]  Filling Pharmacy Fax:    Start Date: 11/1/2021

## 2021-11-02 NOTE — TELEPHONE ENCOUNTER
Prior Authorization Approval    Authorization Effective Date: 11/1/2021  Authorization Expiration Date: 10/31/2024  Medication: pantoprazole (PROTONIX) 40 MG EC tablet INS HAS QTY LIMITAIONS PER YEAR NEED [place]  Approved Dose/Quantity:    Reference #:     Insurance Company: [facility] [employer] - Phone [phone] Fax [phone]  Expected CoPay:       CoPay Card Available:      Foundation Assistance Needed:    Which Pharmacy is filling the prescription (Not needed for infusion/clinic administered): [facility] [address] [place] [id number] [facility]  Pharmacy Notified: Yes  Patient Notified: Yes  **Instructed pharmacy to notify patient when script is ready to /ship.**

## 2021-11-11 ENCOUNTER — VIRTUAL VISIT (OUTPATIENT)
Dept: PSYCHOLOGY | Facility: CLINIC | Age: 56
End: 2021-11-11
Payer: COMMERCIAL

## 2021-11-11 DIAGNOSIS — F41.1 GENERALIZED ANXIETY DISORDER: Primary | ICD-10-CM

## 2021-11-11 PROCEDURE — 90834 PSYTX W PT 45 MINUTES: CPT | Mod: 95 | Performed by: COUNSELOR

## 2021-11-11 ASSESSMENT — ANXIETY QUESTIONNAIRES
2. NOT BEING ABLE TO STOP OR CONTROL WORRYING: NOT AT ALL
7. FEELING AFRAID AS IF SOMETHING AWFUL MIGHT HAPPEN: NOT AT ALL
3. WORRYING TOO MUCH ABOUT DIFFERENT THINGS: NOT AT ALL
1. FEELING NERVOUS, ANXIOUS, OR ON EDGE: SEVERAL DAYS
6. BECOMING EASILY ANNOYED OR IRRITABLE: NOT AT ALL
5. BEING SO RESTLESS THAT IT IS HARD TO SIT STILL: NOT AT ALL
GAD7 TOTAL SCORE: 2

## 2021-11-11 ASSESSMENT — PATIENT HEALTH QUESTIONNAIRE - PHQ9: 5. POOR APPETITE OR OVEREATING: SEVERAL DAYS

## 2021-11-11 NOTE — PROGRESS NOTES
Progress Note    Patient Name: Faith Oakes  Date: 11/11/2021           Service Type: Individual      Session Start Time: 10:00am  Session End Time: 10:40am     Session Length: 40 mins     Session #: 7    Attendees: Client attended alone    Service Modality:  Video Visit:      Provider verified identity through the following two step process.  Patient provided:  Patient is known previously to provider    Telemedicine Visit: The patient's condition can be safely assessed and treated via synchronous audio and visual telemedicine encounter.      Reason for Telemedicine Visit: Services only offered telehealth    Originating Site (Patient Location): Patient's home    Distant Site (Provider Location): Provider Remote Setting- Home Office    Consent:  The patient/guardian has verbally consented to: the potential risks and benefits of telemedicine (video visit) versus in person care; bill my insurance or make self-payment for services provided; and responsibility for payment of non-covered services.     Patient would like the video invitation sent by:  My Chart    Mode of Communication:  Video Conference via [employer]    As the provider I attest to compliance with applicable laws and regulations related to telemedicine.     Treatment Plan Last Reviewed: July 2021  PHQ-9 / CYNDI-7 : 2/2    DATA  Interactive Complexity: No  Crisis: No       Progress Since Last Session (Related to Symptoms / Goals / Homework):   Symptoms: mental health is stable but physical health is still an issues- going to see specialist     Homework: Achieved / completed to satisfaction      Episode of Care Goals: Achieved / completed to satisfaction - MAINTENANCE (Working to maintain change, with risk of relapse); Intervened by continuing to positively reinforce healthy behavior choice      Current / Ongoing Stressors and Concerns:   Physical symptom of chest tightness and burning that comes along with  "anxiety/worry/stress              Work/snf               \"perfectionism\"     Treatment Objective(s) Addressed in This Session:   Routine established for transition   neuroplasticity      Intervention:   CBT: thought forming habits  Emotion Focused Therapy: supportive listening, emotional process  Solution Focused: transition planning        ASSESSMENT: Current Emotional / Mental Status (status of significant symptoms):   Risk status (Self / Other harm or suicidal ideation)   Patient denies current fears or concerns for personal safety.   Patient denies current or recent suicidal ideation or behaviors.   Patient denies current or recent homicidal ideation or behaviors.   Patient denies current or recent self injurious behavior or ideation.   Patient denies other safety concerns.   Patient reports there has been no change in risk factors since their last session.     Patient reports there has been no change in protective factors since their last session.     Recommended that patient call 911 or go to the local ED should there be a change in any of these risk factors.     Appearance:   Appropriate    Eye Contact:   Good    Psychomotor Behavior: Normal    Attitude:   Cooperative  Friendly   Orientation:   All   Speech    Rate / Production: Normal     Volume:  Normal    Mood:    Normal   Affect:    Appropriate    Thought Content:  Clear    Thought Form:  Coherent  Logical    Insight:    Good      Medication Review:   No current psychiatric medications prescribed     Medication Compliance:   NA     Changes in Health Issues:   Yes: gastro, Associated Psychological Distress     Chemical Use Review:   Substance Use: Chemical use reviewed, no active concerns identified      Tobacco Use: No current tobacco use.      Diagnosis:  1. Generalized anxiety disorder        Collateral Reports Completed:   Not Applicable    PLAN: (Patient Tasks / Therapist Tasks / Other)  Provider assigned client to watch video link       Check " on goals   YESICA Bautista 11/11/2021                                                           ______________________________________________________________________     Treatment Plan     Patient's Name: Faith Oakes                 YOB: 1965     Date: 7/7/2021        DSM5 Diagnoses: 300.02 (F41.1) Generalized Anxiety Disorder  Psychosocial / Contextual Factors: deadlines, work related stressors, health of self (past cancer), health of parents  WHODAS: 17     Referral / Collaboration:  Referral to another professional/service is not indicated at this time.     Anticipated number of session or this episode of care: 8-12        MeasurableTreatment Goal(s) related to diagnosis / functional impairment(s)  Goal 1: Patient will report a 50% reduction in physical symptoms (currently:1x a week)    I will know I've met my goal when physical symptoms lessen or are gone and I can stop medication. I will know what to do to help my symptoms.       Objective #A (Patient Action)                          Patient will identify   initial signs or symptoms of anxiety.  Status: New - Date: 7/7/2021      Intervention(s)  Therapist will teach emotional recognition/identification.       Objective #B  Patient will use at least 2 coping skills for anxiety management in the next 4 weeks.  Status: New - Date: 7/7/2021      Intervention(s)  Therapist will teach emotional regulation skills.       Objective #C  Patient will use relaxation strategies 1x times per day to reduce the physical symptoms of anxiety.  Status: New - Date: 7/7/2021      Intervention(s)  Therapist will teach relaxation techniques.     Patient has reviewed and agreed to the above plan.        YESICA Price                  July 7, 2021

## 2021-11-12 ENCOUNTER — TRANSFERRED RECORDS (OUTPATIENT)
Dept: HEALTH INFORMATION MANAGEMENT | Facility: CLINIC | Age: 56
End: 2021-11-12
Payer: COMMERCIAL

## 2021-11-12 ASSESSMENT — ANXIETY QUESTIONNAIRES: GAD7 TOTAL SCORE: 2

## 2021-11-12 ASSESSMENT — PATIENT HEALTH QUESTIONNAIRE - PHQ9: SUM OF ALL RESPONSES TO PHQ QUESTIONS 1-9: 2

## 2021-11-26 ENCOUNTER — ANCILLARY PROCEDURE (OUTPATIENT)
Dept: MAMMOGRAPHY | Facility: CLINIC | Age: 56
End: 2021-11-26
Attending: FAMILY MEDICINE
Payer: COMMERCIAL

## 2021-11-26 DIAGNOSIS — Z12.31 VISIT FOR SCREENING MAMMOGRAM: ICD-10-CM

## 2021-11-26 PROCEDURE — 77063 BREAST TOMOSYNTHESIS BI: CPT | Mod: TC | Performed by: RADIOLOGY

## 2021-11-26 PROCEDURE — 77067 SCR MAMMO BI INCL CAD: CPT | Mod: TC | Performed by: RADIOLOGY

## 2021-12-01 ENCOUNTER — IMMUNIZATION (OUTPATIENT)
Dept: NURSING | Facility: CLINIC | Age: 56
End: 2021-12-01
Payer: COMMERCIAL

## 2021-12-01 PROCEDURE — 91300 PR COVID VAC PFIZER DIL RECON 30 MCG/0.3 ML IM: CPT

## 2021-12-01 PROCEDURE — 0004A PR COVID VAC PFIZER DIL RECON 30 MCG/0.3 ML IM: CPT

## 2021-12-03 ENCOUNTER — OFFICE VISIT (OUTPATIENT)
Dept: FAMILY MEDICINE | Facility: CLINIC | Age: 56
End: 2021-12-03
Payer: COMMERCIAL

## 2021-12-03 DIAGNOSIS — E04.9 ENLARGED THYROID GLAND: Primary | ICD-10-CM

## 2021-12-03 DIAGNOSIS — K14.6 BURNING MOUTH SYNDROME: ICD-10-CM

## 2021-12-03 DIAGNOSIS — R07.0 BURNING SENSATION OF THROAT: ICD-10-CM

## 2021-12-03 LAB
ERYTHROCYTE [DISTWIDTH] IN BLOOD BY AUTOMATED COUNT: 13.1 % (ref 10–15)
HCT VFR BLD AUTO: 44.6 % (ref 35–47)
HGB BLD-MCNC: 15.2 G/DL (ref 11.7–15.7)
MCH RBC QN AUTO: 31.2 PG (ref 26.5–33)
MCHC RBC AUTO-ENTMCNC: 34.1 G/DL (ref 31.5–36.5)
MCV RBC AUTO: 92 FL (ref 78–100)
PLATELET # BLD AUTO: 241 10E3/UL (ref 150–450)
RBC # BLD AUTO: 4.87 10E6/UL (ref 3.8–5.2)
VIT B12 SERPL-MCNC: 871 PG/ML (ref 193–986)
WBC # BLD AUTO: 4.2 10E3/UL (ref 4–11)

## 2021-12-03 PROCEDURE — 82607 VITAMIN B-12: CPT | Performed by: FAMILY MEDICINE

## 2021-12-03 PROCEDURE — 36415 COLL VENOUS BLD VENIPUNCTURE: CPT | Performed by: FAMILY MEDICINE

## 2021-12-03 PROCEDURE — 84443 ASSAY THYROID STIM HORMONE: CPT | Performed by: FAMILY MEDICINE

## 2021-12-03 PROCEDURE — 99214 OFFICE O/P EST MOD 30 MIN: CPT | Performed by: FAMILY MEDICINE

## 2021-12-03 PROCEDURE — 85027 COMPLETE CBC AUTOMATED: CPT | Performed by: FAMILY MEDICINE

## 2021-12-03 ASSESSMENT — MIFFLIN-ST. JEOR: SCORE: 1134.06

## 2021-12-03 NOTE — PROGRESS NOTES
"  Assessment & Plan     Enlarged thyroid gland  ?left thyroid nodule .    - TSH with free T4 reflex  - US Thyroid; Future    Burning mouth syndrome  -Off and on burning of tongue and throat .  - Resolves spontaneously   Denies any reflex or reflex symptoms .  - no improvement with protonix     - explained possibility of burning mouth syndrome   - explained diagnosis of exclusion .    - will obtain blood work .  - CBC with platelets  - Vitamin B12  - Otolaryngology Referral; Future    Burning sensation of throat  - no improvement with protonix   - Will obtain consultation with ENT for autolaryngoscopy .       Return in about 6 months (around 6/3/2022) for Routine preventive, patient will call.    Sarah England MD  [facility] is a 56 year old who presents for the following health issues   History of Present Illness       She eats 2-3 servings of fruits and vegetables daily.She consumes 1 sweetened beverage(s) daily.She exercises with enough effort to increase her heart rate 20 to 29 minutes per day.  She exercises with enough effort to increase her heart rate 5 days per week.   She is taking medications regularly.     Has had this since 2015, but in the last month is has been more constant. Tried diet changes, exercise, lying down, and nothing seems to trigger it. It is random.  Pressure in chest, mild but every once and awhile will be uncomfortable,  is associated with the burning but doesn't always have the chest pressure when burning occurs.           Review of Systems   HENT: Negative for ear discharge and facial swelling.    Genitourinary: Negative for dysuria.   Musculoskeletal: Negative for arthralgias and back pain.   Neurological: Negative for headaches.   Psychiatric/Behavioral: Negative for behavioral problems.            Objective    BP (!) 140/76   Pulse (!) 124   Temp 98.2  F (36.8  C) (Oral)   Resp 18   Ht 1.613 m (5' 3.5\")   Wt 56.7 kg (125 lb)   LMP " 08/27/2012   SpO2 99%   BMI 21.80 kg/m    Body mass index is 21.8 kg/m .  Physical Exam  HENT:      Mouth/Throat:      Mouth: Mucous membranes are moist.      Pharynx: No oropharyngeal exudate or posterior oropharyngeal erythema.      Comments: Enlarged thyroid with thyroid lump left .  Cardiovascular:      Rate and Rhythm: Normal rate.   Pulmonary:      Effort: Pulmonary effort is normal.   Abdominal:      General: Abdomen is flat.   Skin:     General: Skin is warm.   Psychiatric:         Mood and Affect: Mood normal.

## 2021-12-04 LAB — TSH SERPL DL<=0.005 MIU/L-ACNC: 1.44 MU/L (ref 0.4–4)

## 2021-12-05 VITALS
RESPIRATION RATE: 18 BRPM | OXYGEN SATURATION: 99 % | SYSTOLIC BLOOD PRESSURE: 140 MMHG | BODY MASS INDEX: 21.34 KG/M2 | WEIGHT: 125 LBS | HEIGHT: 64 IN | TEMPERATURE: 98.2 F | HEART RATE: 94 BPM | DIASTOLIC BLOOD PRESSURE: 76 MMHG

## 2021-12-05 ASSESSMENT — ENCOUNTER SYMPTOMS
DYSURIA: 0
HEADACHES: 0
FACIAL SWELLING: 0
BACK PAIN: 0
ARTHRALGIAS: 0

## 2021-12-10 ENCOUNTER — HOSPITAL ENCOUNTER (OUTPATIENT)
Dept: ULTRASOUND IMAGING | Facility: CLINIC | Age: 56
Discharge: HOME | End: 2021-12-10
Attending: FAMILY MEDICINE | Admitting: FAMILY MEDICINE
Payer: COMMERCIAL

## 2021-12-10 DIAGNOSIS — E04.9 ENLARGED THYROID GLAND: ICD-10-CM

## 2021-12-10 PROCEDURE — 76536 US EXAM OF HEAD AND NECK: CPT

## 2021-12-16 ENCOUNTER — VIRTUAL VISIT (OUTPATIENT)
Dept: PSYCHOLOGY | Facility: CLINIC | Age: 56
End: 2021-12-16
Payer: COMMERCIAL

## 2021-12-16 DIAGNOSIS — F41.1 GENERALIZED ANXIETY DISORDER: Primary | ICD-10-CM

## 2021-12-16 PROCEDURE — 90834 PSYTX W PT 45 MINUTES: CPT | Mod: 95 | Performed by: COUNSELOR

## 2021-12-16 ASSESSMENT — ANXIETY QUESTIONNAIRES
5. BEING SO RESTLESS THAT IT IS HARD TO SIT STILL: NOT AT ALL
7. FEELING AFRAID AS IF SOMETHING AWFUL MIGHT HAPPEN: SEVERAL DAYS
GAD7 TOTAL SCORE: 3
3. WORRYING TOO MUCH ABOUT DIFFERENT THINGS: NOT AT ALL
1. FEELING NERVOUS, ANXIOUS, OR ON EDGE: SEVERAL DAYS
6. BECOMING EASILY ANNOYED OR IRRITABLE: NOT AT ALL
2. NOT BEING ABLE TO STOP OR CONTROL WORRYING: NOT AT ALL

## 2021-12-16 ASSESSMENT — PATIENT HEALTH QUESTIONNAIRE - PHQ9
SUM OF ALL RESPONSES TO PHQ QUESTIONS 1-9: 1
5. POOR APPETITE OR OVEREATING: SEVERAL DAYS

## 2021-12-16 NOTE — PROGRESS NOTES
Progress Note    Patient Name: Faith Oakes  Date: 12/16/2021           Service Type: Individual      Session Start Time: 9:02am  Session End Time: 9:48am     Session Length: 46 mins     Session #: 8    Attendees: Client attended alone    Service Modality:  Video Visit:      Provider verified identity through the following two step process.  Patient provided:  Patient is known previously to provider    Telemedicine Visit: The patient's condition can be safely assessed and treated via synchronous audio and visual telemedicine encounter.      Reason for Telemedicine Visit: Services only offered telehealth    Originating Site (Patient Location): Patient's home    Distant Site (Provider Location): Provider Remote Setting- Home Office    Consent:  The patient/guardian has verbally consented to: the potential risks and benefits of telemedicine (video visit) versus in person care; bill my insurance or make self-payment for services provided; and responsibility for payment of non-covered services.     Patient would like the video invitation sent by:  My Chart    Mode of Communication:  Video Conference via [employer]    As the provider I attest to compliance with applicable laws and regulations related to telemedicine.     Treatment Plan Last Reviewed: 7/2021  PHQ-9 / CYNDI-7 : 1/3    DATA  Interactive Complexity: No  Crisis: No       Progress Since Last Session (Related to Symptoms / Goals / Homework):   Symptoms: Improving stable    Homework: Achieved / completed to satisfaction      Episode of Care Goals: Satisfactory progress - ACTION (Actively working towards change); Intervened by reinforcing change plan / affirming steps taken     Current / Ongoing Stressors and Concerns:   Health concerns/symptoms     Treatment Objective(s) Addressed in This Session:   use cognitive strategies identified in therapy to challenge anxious thoughts  neuroplasticity  (video)     Intervention:   CBT: cognitive distortions  Emotion Focused Therapy: supportive listening, emotional processing        ASSESSMENT: Current Emotional / Mental Status (status of significant symptoms):   Risk status (Self / Other harm or suicidal ideation)   Patient denies current fears or concerns for personal safety.   Patient denies current or recent suicidal ideation or behaviors.   Patient denies current or recent homicidal ideation or behaviors.   Patient denies current or recent self injurious behavior or ideation.   Patient denies other safety concerns.   Patient reports there has been no change in risk factors since their last session.     Patient reports there has been no change in protective factors since their last session.     Recommended that patient call 911 or go to the local ED should there be a change in any of these risk factors.     Appearance:   Appropriate    Eye Contact:   Good    Psychomotor Behavior: Normal    Attitude:   Cooperative    Orientation:   All   Speech    Rate / Production: Normal     Volume:  Normal    Mood:    Normal   Affect:    Appropriate    Thought Content:  Clear  Rumination    Thought Form:  Coherent  Logical    Insight:    Good      Medication Review:   No current psychiatric medications prescribed     Medication Compliance:   NA     Changes in Health Issues:   Yes: burning issues , Associated Psychological Distress     Chemical Use Review:   Substance Use: Chemical use reviewed, no active concerns identified      Tobacco Use: No current tobacco use.      Diagnosis:  1. Generalized anxiety disorder        Collateral Reports Completed:   Not Applicable    PLAN: (Patient Tasks / Therapist Tasks / Other)  Provider assigned client to watch video         Sarah Morocho [facility] 12/16/2021                                                           ______________________________________________________________________     Treatment Plan     Patient's Name: Faith NEGRO  Champ                 YOB: 1965     Date: 7/7/2021        DSM5 Diagnoses: 300.02 (F41.1) Generalized Anxiety Disorder  Psychosocial / Contextual Factors: deadlines, work related stressors, health of self (past cancer), health of parents  WHODAS: 17     Referral / Collaboration:  Referral to another professional/service is not indicated at this time.     Anticipated number of session or this episode of care: 8-12        MeasurableTreatment Goal(s) related to diagnosis / functional impairment(s)  Goal 1: Patient will report a 50% reduction in physical symptoms (currently:1x a week)    I will know I've met my goal when physical symptoms lessen or are gone and I can stop medication. I will know what to do to help my symptoms.       Objective #A (Patient Action)                          Patient will identify   initial signs or symptoms of anxiety.  Status: New - Date: 7/7/2021      Intervention(s)  Therapist will teach emotional recognition/identification.       Objective #B  Patient will use at least 2 coping skills for anxiety management in the next 4 weeks.  Status: New - Date: 7/7/2021      Intervention(s)  Therapist will teach emotional regulation skills.       Objective #C  Patient will use relaxation strategies 1x times per day to reduce the physical symptoms of anxiety.  Status: New - Date: 7/7/2021      Intervention(s)  Therapist will teach relaxation techniques.     Patient has reviewed and agreed to the above plan.        Sarah Morocho, [facility]                  July 7, 2021

## 2021-12-17 ENCOUNTER — TRANSFERRED RECORDS (OUTPATIENT)
Dept: HEALTH INFORMATION MANAGEMENT | Facility: CLINIC | Age: 56
End: 2021-12-17
Payer: COMMERCIAL

## 2021-12-17 ASSESSMENT — ANXIETY QUESTIONNAIRES: GAD7 TOTAL SCORE: 3

## 2022-01-26 ENCOUNTER — VIRTUAL VISIT (OUTPATIENT)
Dept: PSYCHOLOGY | Facility: CLINIC | Age: 57
End: 2022-01-26
Payer: COMMERCIAL

## 2022-01-26 DIAGNOSIS — F41.1 GENERALIZED ANXIETY DISORDER: Primary | ICD-10-CM

## 2022-01-26 PROCEDURE — 90834 PSYTX W PT 45 MINUTES: CPT | Mod: 95 | Performed by: COUNSELOR

## 2022-01-26 ASSESSMENT — ANXIETY QUESTIONNAIRES
2. NOT BEING ABLE TO STOP OR CONTROL WORRYING: NOT AT ALL
6. BECOMING EASILY ANNOYED OR IRRITABLE: NOT AT ALL
5. BEING SO RESTLESS THAT IT IS HARD TO SIT STILL: NOT AT ALL
3. WORRYING TOO MUCH ABOUT DIFFERENT THINGS: NOT AT ALL
1. FEELING NERVOUS, ANXIOUS, OR ON EDGE: SEVERAL DAYS
7. FEELING AFRAID AS IF SOMETHING AWFUL MIGHT HAPPEN: NOT AT ALL
GAD7 TOTAL SCORE: 2

## 2022-01-26 ASSESSMENT — PATIENT HEALTH QUESTIONNAIRE - PHQ9
5. POOR APPETITE OR OVEREATING: SEVERAL DAYS
SUM OF ALL RESPONSES TO PHQ QUESTIONS 1-9: 1

## 2022-01-26 NOTE — PROGRESS NOTES
Progress Note    Patient Name: Faith Oakes  Date: 1/26/2022           Service Type: Individual      Session Start Time: 11:01am  Session End Time: 11:45am     Session Length: 44 mins     Session #: 9    Attendees: Client attended alone    Service Modality:  Video Visit:      Provider verified identity through the following two step process.  Patient provided:  Patient is known previously to provider    Telemedicine Visit: The patient's condition can be safely assessed and treated via synchronous audio and visual telemedicine encounter.      Reason for Telemedicine Visit: Services only offered telehealth    Originating Site (Patient Location): Patient's home    Distant Site (Provider Location): Provider Remote Setting- Home Office    Consent:  The patient/guardian has verbally consented to: the potential risks and benefits of telemedicine (video visit) versus in person care; bill my insurance or make self-payment for services provided; and responsibility for payment of non-covered services.     Patient would like the video invitation sent by:  My Chart    Mode of Communication:  Video Conference via Amwell    As the provider I attest to compliance with applicable laws and regulations related to telemedicine.     Treatment Plan Last Reviewed: 7/7/22  PHQ-9 / CYNDI-7 : 1/2    DATA  Interactive Complexity: No  Crisis: No       Progress Since Last Session (Related to Symptoms / Goals / Homework):   Symptoms: Improving      Homework: Achieved / completed to satisfaction      Episode of Care Goals: Satisfactory progress - ACTION (Actively working towards change); Intervened by reinforcing change plan / affirming steps taken     Current / Ongoing Stressors and Concerns:   Throat burning, transition to jail      Treatment Objective(s) Addressed in This Session:   use cognitive strategies identified in therapy to challenge anxious thoughts       Intervention:   CBT:  reframing, neuroplasticity   Emotion Focused Therapy: supporive listening, validation        ASSESSMENT: Current Emotional / Mental Status (status of significant symptoms):   Risk status (Self / Other harm or suicidal ideation)   Patient denies current fears or concerns for personal safety.   Patient denies current or recent suicidal ideation or behaviors.   Patient denies current or recent homicidal ideation or behaviors.   Patient denies current or recent self injurious behavior or ideation.   Patient denies other safety concerns.   Patient reports there has been no change in risk factors since their last session.     Patient reports there has been no change in protective factors since their last session.     Recommended that patient call 911 or go to the local ED should there be a change in any of these risk factors.     Appearance:   Appropriate    Eye Contact:   Good    Psychomotor Behavior: Normal    Attitude:   Cooperative    Orientation:   All   Speech    Rate / Production: Normal     Volume:  Normal    Mood:    Normal   Affect:    Appropriate    Thought Content:  Clear  Rumination    Thought Form:  Coherent  Logical    Insight:    Good      Medication Review:   No changes to current psychiatric medication(s)     Medication Compliance:   Yes     Changes in Health Issues:   Yes: throat, Associated Psychological Distress     Chemical Use Review:   Substance Use: Chemical use reviewed, no active concerns identified      Tobacco Use: No current tobacco use.      Diagnosis:  1. Generalized anxiety disorder        Collateral Reports Completed:   Not Applicable    PLAN: (Patient Tasks / Therapist Tasks / Other)  Provider assigned client to consider changes to weekly/daily planning   Provider assigned client to use self talk around productivity ideas         Sarah Morocho [facility] 1/26/2022                                                              ______________________________________________________________________     Treatment Plan     Patient's Name: Faith Oakes                 YOB: 1965     Date: 7/7/2021        DSM5 Diagnoses: 300.02 (F41.1) Generalized Anxiety Disorder  Psychosocial / Contextual Factors: deadlines, work related stressors, health of self (past cancer), health of parents  WHODAS: 17     Referral / Collaboration:  Referral to another professional/service is not indicated at this time.     Anticipated number of session or this episode of care: 8-12        MeasurableTreatment Goal(s) related to diagnosis / functional impairment(s)  Goal 1: Patient will report a 50% reduction in physical symptoms (currently:1x a week)    I will know I've met my goal when physical symptoms lessen or are gone and I can stop medication. I will know what to do to help my symptoms.       Objective #A (Patient Action)                          Patient will identify   initial signs or symptoms of anxiety.  Status: New - Date: 7/7/2021      Intervention(s)  Therapist will teach emotional recognition/identification.       Objective #B  Patient will use at least 2 coping skills for anxiety management in the next 4 weeks.  Status: New - Date: 7/7/2021      Intervention(s)  Therapist will teach emotional regulation skills.       Objective #C  Patient will use relaxation strategies 1x times per day to reduce the physical symptoms of anxiety.  Status: New - Date: 7/7/2021      Intervention(s)  Therapist will teach relaxation techniques.     Patient has reviewed and agreed to the above plan.        Sarah Morocho, [facility]                  July 7, 2021

## 2022-01-27 ASSESSMENT — ANXIETY QUESTIONNAIRES: GAD7 TOTAL SCORE: 2

## 2022-02-12 ENCOUNTER — HEALTH MAINTENANCE LETTER (OUTPATIENT)
Age: 57
End: 2022-02-12

## 2022-03-03 ENCOUNTER — TRANSFERRED RECORDS (OUTPATIENT)
Dept: HEALTH INFORMATION MANAGEMENT | Facility: CLINIC | Age: 57
End: 2022-03-03
Payer: COMMERCIAL

## 2022-03-29 ENCOUNTER — VIRTUAL VISIT (OUTPATIENT)
Dept: PSYCHOLOGY | Facility: CLINIC | Age: 57
End: 2022-03-29
Payer: COMMERCIAL

## 2022-03-29 DIAGNOSIS — F41.1 GENERALIZED ANXIETY DISORDER: Primary | ICD-10-CM

## 2022-03-29 PROCEDURE — 90834 PSYTX W PT 45 MINUTES: CPT | Mod: GT | Performed by: COUNSELOR

## 2022-03-29 ASSESSMENT — ANXIETY QUESTIONNAIRES
1. FEELING NERVOUS, ANXIOUS, OR ON EDGE: SEVERAL DAYS
7. FEELING AFRAID AS IF SOMETHING AWFUL MIGHT HAPPEN: NOT AT ALL
6. BECOMING EASILY ANNOYED OR IRRITABLE: NOT AT ALL
GAD7 TOTAL SCORE: 1
3. WORRYING TOO MUCH ABOUT DIFFERENT THINGS: NOT AT ALL
2. NOT BEING ABLE TO STOP OR CONTROL WORRYING: NOT AT ALL
5. BEING SO RESTLESS THAT IT IS HARD TO SIT STILL: NOT AT ALL

## 2022-03-29 ASSESSMENT — PATIENT HEALTH QUESTIONNAIRE - PHQ9
5. POOR APPETITE OR OVEREATING: NOT AT ALL
SUM OF ALL RESPONSES TO PHQ QUESTIONS 1-9: 1

## 2022-03-29 NOTE — PROGRESS NOTES
[facility]                                     Progress Note    Patient Name: Faith Oakes  Date: 3/29/2022         Service Type: Individual      Session Start Time: 10:01am  Session End Time: 10:41am     Session Length: 40 mins     Session #: 10    Attendees: Client attended alone    Service Modality:  Video Visit:      Provider verified identity through the following two step process.  Patient provided:  Patient is known previously to provider    Telemedicine Visit: The patient's condition can be safely assessed and treated via synchronous audio and visual telemedicine encounter.      Reason for Telemedicine Visit: Services only offered telehealth    Originating Site (Patient Location): Patient's home    Distant Site (Provider Location): Provider Remote Setting- Home Office    Consent:  The patient/guardian has verbally consented to: the potential risks and benefits of telemedicine (video visit) versus in person care; bill my insurance or make self-payment for services provided; and responsibility for payment of non-covered services.     Patient would like the video invitation sent by:  My Chart    Mode of Communication:  Video Conference via Amwell    As the provider I attest to compliance with applicable laws and regulations related to telemedicine.    DATA  Interactive Complexity: No  Crisis: No        Progress Since Last Session (Related to Symptoms / Goals / Homework):   Symptoms: Improving      Homework: Achieved / completed to satisfaction      Episode of Care Goals: Satisfactory progress - MAINTENANCE (Working to maintain change, with risk of relapse); Intervened by continuing to positively reinforce healthy behavior choice      Current / Ongoing Stressors and Concerns:   Ending work relationship (full long term)      Treatment Objective(s) Addressed in This Session:   use cognitive strategies identified in therapy to challenge anxious thoughts  Reduced physical  symptomology     Intervention:   Emotion Focused Therapy: supportive listening, validation, emotional processing    Assessments completed prior to visit:  The following assessments were completed by patient for this visit:  PHQ9:   PHQ-9 SCORE 7/20/2021 8/18/2021 9/23/2021 11/11/2021 12/16/2021 1/26/2022 3/29/2022   PHQ-9 Total Score MyChart - - - - - - -   PHQ-9 Total Score 1 2 1 2 1 1 1     GAD7:   CYNDI-7 SCORE 7/20/2021 8/18/2021 9/23/2021 11/11/2021 12/16/2021 1/26/2022 3/29/2022   Total Score - - - - - - -   Total Score 3 5 3 2 3 2 1         ASSESSMENT: Current Emotional / Mental Status (status of significant symptoms):   Risk status (Self / Other harm or suicidal ideation)   Patient denies current fears or concerns for personal safety.   Patient denies current or recent suicidal ideation or behaviors.   Patient denies current or recent homicidal ideation or behaviors.   Patient denies current or recent self injurious behavior or ideation.   Patient denies other safety concerns.   Patient reports there has been no change in risk factors since their last session.     Patient reports there has been no change in protective factors since their last session.     Recommended that patient call 911 or go to the local ED should there be a change in any of these risk factors.     Appearance:   Appropriate    Eye Contact:   Good    Psychomotor Behavior: Normal    Attitude:   Cooperative    Orientation:   All   Speech    Rate / Production: Normal     Volume:  Normal    Mood:    Normal   Affect:    Appropriate    Thought Content:  Clear    Thought Form:  Coherent  Logical    Insight:    Good      Medication Review:   No current psychiatric medications prescribed     Medication Compliance:   NA     Changes in Health Issues:   None reported- some issues still with chest burning     Chemical Use Review:   Substance Use: Chemical use reviewed, no active concerns identified      Tobacco Use: No current tobacco use.       Diagnosis:  1. Generalized anxiety disorder        Collateral Reports Completed:   Not Applicable    PLAN: (Patient Tasks / Therapist Tasks / Other)  Provider assigned client to reach out to provider if she would like to be seen prior to this providers leave         Sarah Morocho [facility] 3/29/2022                                                           ______________________________________________________________________     Treatment Plan     Patient's Name: Faith Oakes                 YOB: 1965     Date: 7/7/2021        DSM5 Diagnoses: 300.02 (F41.1) Generalized Anxiety Disorder  Psychosocial / Contextual Factors: deadlines, work related stressors, health of self (past cancer), health of parents  WHODAS: 17     Referral / Collaboration:  Referral to another professional/service is not indicated at this time.     Anticipated number of session or this episode of care: 8-12        MeasurableTreatment Goal(s) related to diagnosis / functional impairment(s)  Goal 1: Patient will report a 50% reduction in physical symptoms (currently:1x a week)    I will know I've met my goal when physical symptoms lessen or are gone and I can stop medication. I will know what to do to help my symptoms.       Objective #A (Patient Action)                          Patient will identify   initial signs or symptoms of anxiety.  Status: New - Date: 7/7/2021      Intervention(s)  Therapist will teach emotional recognition/identification.       Objective #B  Patient will use at least 2 coping skills for anxiety management in the next 4 weeks.  Status: New - Date: 7/7/2021      Intervention(s)  Therapist will teach emotional regulation skills.       Objective #C  Patient will use relaxation strategies 1x times per day to reduce the physical symptoms of anxiety.  Status: New - Date: 7/7/2021      Intervention(s)  Therapist will teach relaxation techniques.     Patient has reviewed and agreed to the above  plan.        Sarah Morocho, [facility]                  July 7, 2021

## 2022-03-30 ASSESSMENT — ANXIETY QUESTIONNAIRES: GAD7 TOTAL SCORE: 1

## 2022-05-17 ENCOUNTER — TRANSFERRED RECORDS (OUTPATIENT)
Dept: HEALTH INFORMATION MANAGEMENT | Facility: CLINIC | Age: 57
End: 2022-05-17
Payer: COMMERCIAL

## 2022-06-27 ENCOUNTER — TRANSFERRED RECORDS (OUTPATIENT)
Dept: HEALTH INFORMATION MANAGEMENT | Facility: CLINIC | Age: 57
End: 2022-06-27

## 2022-10-09 ENCOUNTER — HEALTH MAINTENANCE LETTER (OUTPATIENT)
Age: 57
End: 2022-10-09

## 2022-10-26 ENCOUNTER — TRANSFERRED RECORDS (OUTPATIENT)
Dept: HEALTH INFORMATION MANAGEMENT | Facility: CLINIC | Age: 57
End: 2022-10-26

## 2022-11-28 ENCOUNTER — ANCILLARY PROCEDURE (OUTPATIENT)
Dept: MAMMOGRAPHY | Facility: CLINIC | Age: 57
End: 2022-11-28
Attending: FAMILY MEDICINE
Payer: COMMERCIAL

## 2022-11-28 DIAGNOSIS — Z12.31 VISIT FOR SCREENING MAMMOGRAM: ICD-10-CM

## 2022-11-28 PROCEDURE — 77067 SCR MAMMO BI INCL CAD: CPT | Mod: TC | Performed by: RADIOLOGY

## 2022-11-28 PROCEDURE — 77063 BREAST TOMOSYNTHESIS BI: CPT | Mod: TC | Performed by: RADIOLOGY

## 2023-03-21 ENCOUNTER — ANCILLARY PROCEDURE (OUTPATIENT)
Dept: GENERAL RADIOLOGY | Facility: CLINIC | Age: 58
End: 2023-03-21
Attending: FAMILY MEDICINE
Payer: COMMERCIAL

## 2023-03-21 ENCOUNTER — OFFICE VISIT (OUTPATIENT)
Dept: FAMILY MEDICINE | Facility: CLINIC | Age: 58
End: 2023-03-21
Payer: COMMERCIAL

## 2023-03-21 VITALS
BODY MASS INDEX: 21.51 KG/M2 | WEIGHT: 126 LBS | HEART RATE: 114 BPM | RESPIRATION RATE: 18 BRPM | TEMPERATURE: 98.3 F | SYSTOLIC BLOOD PRESSURE: 131 MMHG | DIASTOLIC BLOOD PRESSURE: 86 MMHG | OXYGEN SATURATION: 99 % | HEIGHT: 64 IN

## 2023-03-21 DIAGNOSIS — M79.644 PAIN OF FINGER OF RIGHT HAND: Primary | ICD-10-CM

## 2023-03-21 DIAGNOSIS — R10.84 ABDOMINAL PAIN, GENERALIZED: ICD-10-CM

## 2023-03-21 DIAGNOSIS — M79.644 PAIN OF FINGER OF RIGHT HAND: ICD-10-CM

## 2023-03-21 LAB
CRP SERPL-MCNC: <3 MG/L
ERYTHROCYTE [DISTWIDTH] IN BLOOD BY AUTOMATED COUNT: 13.4 % (ref 10–15)
HCT VFR BLD AUTO: 45.4 % (ref 35–47)
HGB BLD-MCNC: 14.9 G/DL (ref 11.7–15.7)
MCH RBC QN AUTO: 30.3 PG (ref 26.5–33)
MCHC RBC AUTO-ENTMCNC: 32.8 G/DL (ref 31.5–36.5)
MCV RBC AUTO: 93 FL (ref 78–100)
PLATELET # BLD AUTO: 248 10E3/UL (ref 150–450)
RBC # BLD AUTO: 4.91 10E6/UL (ref 3.8–5.2)
URATE SERPL-MCNC: 3.7 MG/DL (ref 2.4–5.7)
WBC # BLD AUTO: 4.9 10E3/UL (ref 4–11)

## 2023-03-21 PROCEDURE — 84550 ASSAY OF BLOOD/URIC ACID: CPT | Performed by: FAMILY MEDICINE

## 2023-03-21 PROCEDURE — 36415 COLL VENOUS BLD VENIPUNCTURE: CPT | Performed by: FAMILY MEDICINE

## 2023-03-21 PROCEDURE — 73130 X-RAY EXAM OF HAND: CPT | Mod: TC | Performed by: RADIOLOGY

## 2023-03-21 PROCEDURE — 82306 VITAMIN D 25 HYDROXY: CPT | Performed by: FAMILY MEDICINE

## 2023-03-21 PROCEDURE — 99214 OFFICE O/P EST MOD 30 MIN: CPT | Performed by: FAMILY MEDICINE

## 2023-03-21 PROCEDURE — 86200 CCP ANTIBODY: CPT | Performed by: FAMILY MEDICINE

## 2023-03-21 PROCEDURE — 86140 C-REACTIVE PROTEIN: CPT | Performed by: FAMILY MEDICINE

## 2023-03-21 PROCEDURE — 85027 COMPLETE CBC AUTOMATED: CPT | Performed by: FAMILY MEDICINE

## 2023-03-21 PROCEDURE — 86431 RHEUMATOID FACTOR QUANT: CPT | Performed by: FAMILY MEDICINE

## 2023-03-21 RX ORDER — SPIRONOLACTONE 50 MG/1
1 TABLET, FILM COATED ORAL
COMMUNITY
Start: 2023-01-12

## 2023-03-21 ASSESSMENT — ENCOUNTER SYMPTOMS
ACTIVITY CHANGE: 0
UNEXPECTED WEIGHT CHANGE: 0
PALPITATIONS: 0
DIFFICULTY URINATING: 0
FEVER: 0
APPETITE CHANGE: 0
CHEST TIGHTNESS: 0
TROUBLE SWALLOWING: 0
MYALGIAS: 0
SHORTNESS OF BREATH: 0
DIZZINESS: 0
HEADACHES: 0

## 2023-03-21 ASSESSMENT — PAIN SCALES - GENERAL: PAINLEVEL: NO PAIN (0)

## 2023-03-21 NOTE — PROGRESS NOTES
(M56.613) Pain of finger of right hand  (primary encounter diagnosis)  Comment: Awaiting lab results. Continue using ibuprofen for pain.   Plan: CRP, inflammation, Rheumatoid factor, Cyclic         Citrullinated Peptide Antibody IgG, CBC with         platelets, Vitamin D Deficiency, Uric acid, XR         Hand Right G/E 3 Views          (R10.84) Abdominal pain, generalized  Comment: Recommend general healthy bowel habits- start fiber and probiotics. Can use metamucil if continue to have abdominal pain or constipation.   Plan: Monitor and make changes as above.         William Cuevas is a 57 year old, presenting for the following health issues:  Finger (Right index finger swollen at the PIP. Ongoing for about 4 weeks. No injury or trauma per patient.) and Abdominal Pain (Onoing for for 2 months. Cramping, only passing mucus. Denies constipation or diarrhea. No blood in stool.)      History of Present Illness       Reason for visit:  Swollen and tender right index finger since 2/21/23.    She eats 2-3 servings of fruits and vegetables daily.She consumes 0 sweetened beverage(s) daily.She exercises with enough effort to increase her heart rate 20 to 29 minutes per day.  She exercises with enough effort to increase her heart rate 3 or less days per week.   She is taking medications regularly.       Bowel changes  Onset/Duration: 2 months  Description:       Consistency of stool: mucousy       Blood in stool: No  Progression of Symptoms: intermittent  Accompanying signs and symptoms:       Fever: No       Nausea/Vomiting: No       Abdominal pain: YES       Weight loss: No       Episodes of constipation: No  History   Ill contacts: No  Recent use of antibiotics: No  Recent travels: No  Recent medication-new or changes(Rx or OTC): No  Precipitating or alleviating factors: None  Therapies tried and outcome: none    About a month of finger swelling. First two days were the worst with pain, swelling, and was also warm to  "touch. Worse in the morning. Aggravated when using hands more. Ibuprofen and Aleeve helps with pain but not swelling. R hand index finger proximal metacarpal. No other joints.     No family history of arthritis. Family history of gout.     2 months of bowel changes. Usually had 1 bowel movement a day, when on vacation in [place]/ [place] noticed it had been 4 days since stooling. Had mucousy stools with small marble sized stools. Then was better for 2 weeks, came back for two weeks, then gone again. Just today mucousy stools seemed to come back.   Abdominal pain right before stooling, feels better after stooling.   History of hemorrhoids, twice that there was some blood but was not concerning to her.     Pain with intercourse long standing from atrophic vaginitis.     Review of Systems   Constitutional: Negative for activity change, appetite change, fever and unexpected weight change.   HENT: Negative for trouble swallowing.    Eyes: Negative for visual disturbance.   Respiratory: Negative for chest tightness and shortness of breath.    Cardiovascular: Negative for chest pain and palpitations.   Genitourinary: Negative for difficulty urinating.   Musculoskeletal: Negative for myalgias.   Neurological: Negative for dizziness and headaches.            Objective    /86 (BP Location: Right arm, Patient Position: Sitting, Cuff Size: Adult Regular)   Pulse 114   Temp 98.3  F (36.8  C) (Tympanic)   Resp 18   Ht 1.613 m (5' 3.5\")   Wt 57.2 kg (126 lb)   LMP 08/27/2012   SpO2 99%   BMI 21.97 kg/m    Body mass index is 21.97 kg/m .  Physical Exam   GENERAL: healthy, alert and no distress  EYES: Eyes grossly normal to inspection, PERRL and conjunctivae and sclerae normal  HENT: ear canals and TM's normal, nose and mouth without ulcers or lesions  NECK: no adenopathy, no asymmetry, masses, or scars and thyroid normal to palpation  RESP: lungs clear to auscultation - no rales, rhonchi or wheezes  CV: regular rate " and rhythm, normal S1 S2, no S3 or S4, no murmur, click or rub, no peripheral edema and peripheral pulses strong  ABDOMEN: LLQ firm, nontender, without hepatosplenomegaly or masses and bowel sounds normal  MS: normal muscle tone and strength, swelling of PIP R 2nd finger   SKIN: no suspicious lesions or rashes  NEURO: Normal strength and tone, mentation intact and speech normal  PSYCH: mentation appears normal, affect normal/bright

## 2023-03-22 LAB
DEPRECATED CALCIDIOL+CALCIFEROL SERPL-MC: 47 UG/L (ref 20–75)
RHEUMATOID FACT SER NEPH-ACNC: <7 IU/ML

## 2023-03-23 LAB — CCP AB SER IA-ACNC: 1.1 U/ML

## 2023-03-28 ENCOUNTER — TRANSFERRED RECORDS (OUTPATIENT)
Dept: HEALTH INFORMATION MANAGEMENT | Facility: CLINIC | Age: 58
End: 2023-03-28
Payer: COMMERCIAL

## 2023-04-19 ENCOUNTER — TRANSFERRED RECORDS (OUTPATIENT)
Dept: HEALTH INFORMATION MANAGEMENT | Facility: CLINIC | Age: 58
End: 2023-04-19

## 2023-05-17 ENCOUNTER — TRANSFERRED RECORDS (OUTPATIENT)
Dept: HEALTH INFORMATION MANAGEMENT | Facility: CLINIC | Age: 58
End: 2023-05-17
Payer: COMMERCIAL

## 2023-05-17 LAB — TSH SERPL-ACNC: 1.97 UIU/ML (ref 0.45–4.5)

## 2023-08-31 ENCOUNTER — PATIENT OUTREACH (OUTPATIENT)
Dept: CARE COORDINATION | Facility: CLINIC | Age: 58
End: 2023-08-31
Payer: COMMERCIAL

## 2023-09-08 DIAGNOSIS — Z82.49 FAMILY HISTORY OF CORONARY ARTERY DISEASE: Primary | ICD-10-CM

## 2023-09-08 NOTE — PROGRESS NOTES
CARDIOLOGY CONSULT    REASON FOR CONSULT: Family history of CAD, dyslipidemia    PRIMARY CARE PHYSICIAN:  Jazzy Camargo    HISTORY OF PRESENT ILLNESS:  58-year-old female seen for dyslipidemia and family history of premature CAD.  She has a history of non-Hodgkin's lymphoma in 1998 treated with chemotherapy and radiation, breast cancer with radiation, prediabetes.    At baseline she is doing quite well.  She does some walking and other moderate activity.  She denies any chest pain, palpitations, or other cardiac symptoms.  Blood pressure historically is well controlled.  She has been on atorvastatin for about 20 years, the dose was increased last year.    Her father had bypass age 40 and has had multiple stents, he smoked.  She has 3 siblings with no known cardiac issues.    PAST MEDICAL HISTORY:  Past Medical History:   Diagnosis Date    Anxiety     DCIS (ductal carcinoma in situ) of breast, left     Non Hodgkin's lymphoma (H)        MEDICATIONS:  Current Outpatient Medications   Medication    ATORVASTATIN CALCIUM PO    calcium gluconate 500 MG TABS    clonazePAM (KLONOPIN) 0.5 MG tablet    cycloSPORINE (RESTASIS) 0.05 % ophthalmic emulsion    doxycycline monohydrate (MONODOX) 50 MG capsule    estradiol (ESTRACE) 0.1 MG/GM vaginal cream    Omega-3 Fatty Acids 1200 MG capsule    pantoprazole (PROTONIX) 20 MG EC tablet    pantoprazole (PROTONIX) 40 MG EC tablet    spironolactone (ALDACTONE) 50 MG tablet     No current facility-administered medications for this visit.       ALLERGIES:  Allergies   Allergen Reactions    Amoxicillin Rash       SOCIAL HISTORY:  I have reviewed this patient's social history and updated it with pertinent information if needed. Faith MARKY Oakes  reports that she has never smoked. She has never used smokeless tobacco. She reports current alcohol use. She reports that she does not use drugs.    FAMILY HISTORY:  I have reviewed this patient's family history and updated it with pertinent  information if needed.   Family History   Problem Relation Age of Onset    Hypertension Mother     Coronary Artery Disease Father        REVIEW OF SYSTEMS:  Constitutional:  No weight loss, fever, chills  HEENT:  Eyes:  No visual loss, blurred vision, double vision or yellow sclerae. No hearing loss, sneezing, congestion, runny nose or sore throat.  Skin:  No rash or itching.  Cardiovascular: per HPI  Respiratory: per HPI  GI:  No anorexia, nausea, vomiting or diarrhea. No abdominal pain or blood.  :  No dysurea, hematuria  Neurologic:  No headache, paralysis, ataxia, numbness or tingling in the extremities. No change in bowel or bladder control.  Musculoskeletal:  No muscle pain  Hematologic:  No bleeding or bruising.  Lymphatics:  No enlarged nodes. No history of splenectomy.  Endocrine:  No reports of sweating, cold or heat intolerance. No polyuria or polydipsia.  Allergies:  No history of asthma, hives, eczema or rhinitis.    PHYSICAL EXAM:                     Vital Signs with Ranges     0 lbs 0 oz    Constitutional: awake, alert, no distress  Eyes: PERRL, sclera nonicteric  ENT: trachea midline  Respiratory: Lungs clear  Cardiovascular: Regular rate and rhythm, no murmurs  GI: nondistended, nontender, bowel sounds present  Lymph/Hematologic: no lymphadenopathy  Skin: dry, no rash  Musculoskeletal: good muscle tone, strength 5/5 in upper and lower extremities  Neurologic: no focal deficits  Neuropsychiatric: appropriate affact    DATA:  Labs:   September 2023: Cholesterol 165, triglycerides 62, HDL 66, LDL 87    EKG: September 12: Sinus rhythm, normal EKG    ASSESSMENT:  58-year-old female seen for cardiac risk factor management.  Her risk factors seem well controlled.  She has no concerning cardiac symptoms.  She is agreeable to a calcium score with her history of premature CAD in her father.  We talked about possible need for more aggressive lipid lowering or occasional stress testing of calcium score is  quite high for age.    RECOMMENDATIONS:  1.  Cardiac risk factor management  -Continue current medications  -Encouraged regular exercise and Mediterranean type diet  -Coronary calcium score    Follow-up as needed based on test results.    Alexis Goodson MD  Cardiology - [facility]  Pager:  [phone]  Text Page  September 12, 2023

## 2023-09-11 ENCOUNTER — LAB (OUTPATIENT)
Dept: LAB | Facility: CLINIC | Age: 58
End: 2023-09-11
Payer: COMMERCIAL

## 2023-09-11 DIAGNOSIS — Z82.49 FAMILY HISTORY OF CORONARY ARTERY DISEASE: ICD-10-CM

## 2023-09-11 LAB
CHOLEST SERPL-MCNC: 165 MG/DL
HDLC SERPL-MCNC: 66 MG/DL
LDLC SERPL CALC-MCNC: 87 MG/DL
NONHDLC SERPL-MCNC: 99 MG/DL
TRIGL SERPL-MCNC: 62 MG/DL

## 2023-09-11 PROCEDURE — 36415 COLL VENOUS BLD VENIPUNCTURE: CPT | Performed by: INTERNAL MEDICINE

## 2023-09-11 PROCEDURE — 80061 LIPID PANEL: CPT | Performed by: INTERNAL MEDICINE

## 2023-09-12 ENCOUNTER — OFFICE VISIT (OUTPATIENT)
Dept: CARDIOLOGY | Facility: CLINIC | Age: 58
End: 2023-09-12
Payer: COMMERCIAL

## 2023-09-12 VITALS
SYSTOLIC BLOOD PRESSURE: 132 MMHG | DIASTOLIC BLOOD PRESSURE: 70 MMHG | BODY MASS INDEX: 22.02 KG/M2 | WEIGHT: 129 LBS | HEART RATE: 104 BPM | HEIGHT: 64 IN

## 2023-09-12 DIAGNOSIS — Z82.49 FAMILY HISTORY OF CORONARY ARTERY DISEASE: Primary | ICD-10-CM

## 2023-09-12 PROCEDURE — 99204 OFFICE O/P NEW MOD 45 MIN: CPT | Performed by: INTERNAL MEDICINE

## 2023-09-12 PROCEDURE — 93000 ELECTROCARDIOGRAM COMPLETE: CPT | Performed by: INTERNAL MEDICINE

## 2023-09-12 NOTE — PATIENT INSTRUCTIONS
"The following are the key risk factors to control to minimize your risk of progressive coronary disease:  -Be on a statin medication and keeping your LDL level less than 70  -Keep blood pressure less than 130/80  -Maintain an ideal body weight, which would be a BMI of 25 or less  -Try to do 150 minutes of aerobic exercise per week  -Avoiding or controlling diabetes, which generally means trying to keep weight under control  -Not smoking  -Adhere to a Mediterranean diet or a whole food plant-based diet      Getting Ready for a CT Coronary Calcium Scan  What is this test?  A calcium scoring CT (computed tomography) scan is a painless, highly sensitive test that shows the amount of calcium build-up in your heart arteries. This tells us your future risk for heart attack.  Any plaque that has started to form in the heart arteries will show up on the CT.  A \"calcium score\" is then calculated and is compared to others of your age and gender.  It is important to keep in mind that everyone develops plaque in the arteries with age, typically starting in once 50s.  Calcium score tends to go up from there.  Your score needs to be compared to other men or women of the same age.  This test does not show the percent blockage in any given artery, but rather a \"global burden\" of plaque.  If the calcium score is very high, then other testing such as stress testing is sometimes recommended.  It is quite common to incidentally find small nodules in the lungs.  These are typically benign findings.  If the nodules exceeds a certain size, it may require a follow-up scan in 6 to 12 months.  If someone has a history of smoking, follow-up recommendations may be more frequent.  Will my insurance cover it?  Please check with your insurance plan. This is a screening test, which may or may not be covered by insurance. The cost is $100-120 if self-pay.  How do I get ready for my exam?  It is best to avoid caffeine on the day of your test.   The " entire exam will take about 30 minutes or less.   Be sure to tell your doctor:   If there s any chance you are pregnant.    If you have any special needs.  What happens during the exam?  Please wear loose clothing, such as a sweat suit or jogging clothes. Avoid snaps, zippers and other metal. We may ask you to undress and put on a hospital gown.    You will lie on a table that will move through the scanner. The scanner is a short tube.    You will not be enclosed. The scan takes only a few minutes.   You must lie very still during the scans and follow breathing instructions.  Is it safe?  As with any scan that uses radiation, there is a very small increased risk of cancer. Your doctor orders a scan when he or she feels the potential benefits outweigh the risks of the scan. Please talk with your doctor if you have any concerns before your exam.  When will I know the results?  You should discuss your test with your family doctor. He or she can go over the results with you. If needed, he or she can also suggest treatment or lifestyle changes.

## 2023-09-12 NOTE — LETTER
9/12/2023    Jazzy Camargo MD  [id number]  [facility] [address]    RE: Faith Oakes       Dear Colleague,     I had the pleasure of seeing Faith Oakes in the [facility].  CARDIOLOGY CONSULT    REASON FOR CONSULT: Family history of CAD, dyslipidemia    PRIMARY CARE PHYSICIAN:  Jazzy Camargo    HISTORY OF PRESENT ILLNESS:  58-year-old female seen for dyslipidemia and family history of premature CAD.  She has a history of non-Hodgkin's lymphoma in 1998 treated with chemotherapy and radiation, breast cancer with radiation, prediabetes.    At baseline she is doing quite well.  She does some walking and other moderate activity.  She denies any chest pain, palpitations, or other cardiac symptoms.  Blood pressure historically is well controlled.  She has been on atorvastatin for about 20 years, the dose was increased last year.    Her father had bypass age 40 and has had multiple stents, he smoked.  She has 3 siblings with no known cardiac issues.    PAST MEDICAL HISTORY:  Past Medical History:   Diagnosis Date    Anxiety     DCIS (ductal carcinoma in situ) of breast, left     Non Hodgkin's lymphoma (H)        MEDICATIONS:  Current Outpatient Medications   Medication    ATORVASTATIN CALCIUM PO    calcium gluconate 500 MG TABS    clonazePAM (KLONOPIN) 0.5 MG tablet    cycloSPORINE (RESTASIS) 0.05 % ophthalmic emulsion    doxycycline monohydrate (MONODOX) 50 MG capsule    estradiol (ESTRACE) 0.1 MG/GM vaginal cream    Omega-3 Fatty Acids 1200 MG capsule    pantoprazole (PROTONIX) 20 MG EC tablet    pantoprazole (PROTONIX) 40 MG EC tablet    spironolactone (ALDACTONE) 50 MG tablet     No current facility-administered medications for this visit.       ALLERGIES:  Allergies   Allergen Reactions    Amoxicillin Rash       SOCIAL HISTORY:  I have reviewed this patient's social history and updated it with pertinent information if needed. Faith Oakes  reports that she has never  smoked. She has never used smokeless tobacco. She reports current alcohol use. She reports that she does not use drugs.    FAMILY HISTORY:  I have reviewed this patient's family history and updated it with pertinent information if needed.   Family History   Problem Relation Age of Onset    Hypertension Mother     Coronary Artery Disease Father        REVIEW OF SYSTEMS:  Constitutional:  No weight loss, fever, chills  HEENT:  Eyes:  No visual loss, blurred vision, double vision or yellow sclerae. No hearing loss, sneezing, congestion, runny nose or sore throat.  Skin:  No rash or itching.  Cardiovascular: per HPI  Respiratory: per HPI  GI:  No anorexia, nausea, vomiting or diarrhea. No abdominal pain or blood.  :  No dysurea, hematuria  Neurologic:  No headache, paralysis, ataxia, numbness or tingling in the extremities. No change in bowel or bladder control.  Musculoskeletal:  No muscle pain  Hematologic:  No bleeding or bruising.  Lymphatics:  No enlarged nodes. No history of splenectomy.  Endocrine:  No reports of sweating, cold or heat intolerance. No polyuria or polydipsia.  Allergies:  No history of asthma, hives, eczema or rhinitis.    PHYSICAL EXAM:                     Vital Signs with Ranges     0 lbs 0 oz    Constitutional: awake, alert, no distress  Eyes: PERRL, sclera nonicteric  ENT: trachea midline  Respiratory: Lungs clear  Cardiovascular: Regular rate and rhythm, no murmurs  GI: nondistended, nontender, bowel sounds present  Lymph/Hematologic: no lymphadenopathy  Skin: dry, no rash  Musculoskeletal: good muscle tone, strength 5/5 in upper and lower extremities  Neurologic: no focal deficits  Neuropsychiatric: appropriate affact    DATA:  Labs:   September 2023: Cholesterol 165, triglycerides 62, HDL 66, LDL 87    EKG: September 12: Sinus rhythm, normal EKG    ASSESSMENT:  58-year-old female seen for cardiac risk factor management.  Her risk factors seem well controlled.  She has no concerning  cardiac symptoms.  She is agreeable to a calcium score with her history of premature CAD in her father.  We talked about possible need for more aggressive lipid lowering or occasional stress testing of calcium score is quite high for age.    RECOMMENDATIONS:  1.  Cardiac risk factor management  -Continue current medications  -Encouraged regular exercise and Mediterranean type diet  -Coronary calcium score    Follow-up as needed based on test results.    Alexis Goodson MD  Cardiology - [facility]  Pager:  [phone]  Text Page  September 12, 2023    Thank you for allowing me to participate in the care of your patient.      Sincerely,   Alexis Goodson MD   [facility]  cc: No referring provider defined for this encounter.

## 2023-09-14 ENCOUNTER — PATIENT OUTREACH (OUTPATIENT)
Dept: CARE COORDINATION | Facility: CLINIC | Age: 58
End: 2023-09-14
Payer: COMMERCIAL

## 2023-09-25 ENCOUNTER — TRANSFERRED RECORDS (OUTPATIENT)
Dept: HEALTH INFORMATION MANAGEMENT | Facility: CLINIC | Age: 58
End: 2023-09-25
Payer: COMMERCIAL

## 2023-09-25 LAB — RETINOPATHY: NEGATIVE

## 2023-10-18 ENCOUNTER — HOSPITAL ENCOUNTER (OUTPATIENT)
Dept: CT IMAGING | Facility: CLINIC | Age: 58
Discharge: HOME | End: 2023-10-18
Attending: INTERNAL MEDICINE | Admitting: INTERNAL MEDICINE
Payer: COMMERCIAL

## 2023-10-18 DIAGNOSIS — Z82.49 FAMILY HISTORY OF CORONARY ARTERY DISEASE: ICD-10-CM

## 2023-10-18 PROCEDURE — 75571 CT HRT W/O DYE W/CA TEST: CPT | Mod: 26 | Performed by: INTERNAL MEDICINE

## 2023-10-18 PROCEDURE — 75571 CT HRT W/O DYE W/CA TEST: CPT

## 2023-10-28 ENCOUNTER — HEALTH MAINTENANCE LETTER (OUTPATIENT)
Age: 58
End: 2023-10-28

## 2023-12-04 ENCOUNTER — HOSPITAL ENCOUNTER (OUTPATIENT)
Dept: MAMMOGRAPHY | Facility: CLINIC | Age: 58
Discharge: HOME | End: 2023-12-04
Attending: FAMILY MEDICINE | Admitting: FAMILY MEDICINE
Payer: COMMERCIAL

## 2023-12-04 DIAGNOSIS — Z12.31 VISIT FOR SCREENING MAMMOGRAM: ICD-10-CM

## 2023-12-04 PROCEDURE — 77067 SCR MAMMO BI INCL CAD: CPT

## 2024-06-10 ENCOUNTER — OFFICE VISIT (OUTPATIENT)
Dept: PEDIATRICS | Facility: CLINIC | Age: 59
End: 2024-06-10
Payer: COMMERCIAL

## 2024-06-10 VITALS
BODY MASS INDEX: 21.54 KG/M2 | OXYGEN SATURATION: 99 % | WEIGHT: 126.2 LBS | HEART RATE: 99 BPM | DIASTOLIC BLOOD PRESSURE: 83 MMHG | HEIGHT: 64 IN | RESPIRATION RATE: 16 BRPM | SYSTOLIC BLOOD PRESSURE: 137 MMHG | TEMPERATURE: 98.5 F

## 2024-06-10 DIAGNOSIS — R21 RASH: Primary | ICD-10-CM

## 2024-06-10 PROCEDURE — 99213 OFFICE O/P EST LOW 20 MIN: CPT | Performed by: PHYSICIAN ASSISTANT

## 2024-06-10 RX ORDER — METRONIDAZOLE 7.5 MG/G
GEL TOPICAL
COMMUNITY
Start: 2023-12-18

## 2024-06-10 RX ORDER — ESTRADIOL 10 UG/1
INSERT VAGINAL
COMMUNITY
Start: 2023-09-25

## 2024-06-10 ASSESSMENT — PAIN SCALES - GENERAL: PAINLEVEL: NO PAIN (0)

## 2024-06-10 NOTE — PROGRESS NOTES
Assessment & Plan     Rash  Unclear of etiology.  Does not appear infectious- not warm. Is already on doxy for skin  Has not worsened in last day  Likely some contact derm or insect bite  Can continue hydrocortisone and monitor.  Follow up two to three days- video visit ok  If worsening follow up sooner.                  Subjective   Faith is a 59 year old, presenting for the following health issues:  Derm Problem        6/10/2024     9:40 AM   Additional Questions   Roomed by ashlee   Accompanied by zofia         6/10/2024     9:40 AM   Patient Reported Additional Medications   Patient reports taking the following new medications na     History of Present Illness       Reason for visit:  Rash on my chest  Symptom onset:  3-7 days ago  Symptoms include:  Redness and itching  Symptom intensity:  Moderate  Symptom progression:  Worsening  Had these symptoms before:  No  What makes it worse:  No  What makes it better:  No    She eats 2-3 servings of fruits and vegetables daily.She consumes 0 sweetened beverage(s) daily.She exercises with enough effort to increase her heart rate 30 to 60 minutes per day.  She exercises with enough effort to increase her heart rate 3 or less days per week.   She is taking medications regularly.       1. Rash x five day, started gricel size area by right neck.  Then spread. Was itching some, no longer. As of yesterday it has not spread any further  No others with similar.   No fevers, body aches. Not aware of insect bite.      Benadryl, hydrocortisone, allergy medicine.                  Objective    LMP 08/27/2012   There is no height or weight on file to calculate BMI.  Physical Exam       Rash- blanches. Not warm or tender                 Signed Electronically by: Zhane Summers PA-C

## 2024-06-12 ENCOUNTER — MYC MEDICAL ADVICE (OUTPATIENT)
Dept: PEDIATRICS | Facility: CLINIC | Age: 59
End: 2024-06-12
Payer: COMMERCIAL

## 2024-09-09 ENCOUNTER — TRANSFERRED RECORDS (OUTPATIENT)
Dept: HEALTH INFORMATION MANAGEMENT | Facility: CLINIC | Age: 59
End: 2024-09-09
Payer: COMMERCIAL

## 2024-09-09 LAB — RETINOPATHY: NEGATIVE

## 2024-12-05 ENCOUNTER — HOSPITAL ENCOUNTER (OUTPATIENT)
Dept: MAMMOGRAPHY | Facility: CLINIC | Age: 59
Discharge: HOME | End: 2024-12-05
Attending: FAMILY MEDICINE
Payer: COMMERCIAL

## 2024-12-05 DIAGNOSIS — Z12.31 VISIT FOR SCREENING MAMMOGRAM: ICD-10-CM

## 2024-12-05 PROCEDURE — 77063 BREAST TOMOSYNTHESIS BI: CPT

## 2024-12-05 PROCEDURE — 77067 SCR MAMMO BI INCL CAD: CPT

## 2024-12-21 ENCOUNTER — HEALTH MAINTENANCE LETTER (OUTPATIENT)
Age: 59
End: 2024-12-21